# Patient Record
Sex: FEMALE | Race: WHITE | Employment: FULL TIME | URBAN - METROPOLITAN AREA
[De-identification: names, ages, dates, MRNs, and addresses within clinical notes are randomized per-mention and may not be internally consistent; named-entity substitution may affect disease eponyms.]

---

## 2024-06-12 ENCOUNTER — HOSPITAL ENCOUNTER (EMERGENCY)
Age: 64
Discharge: HOME | End: 2024-06-12
Attending: STUDENT IN AN ORGANIZED HEALTH CARE EDUCATION/TRAINING PROGRAM
Payer: COMMERCIAL

## 2024-06-12 ENCOUNTER — ANCILLARY PROCEDURE (OUTPATIENT)
Dept: EMERGENCY DEPT | Age: 64
End: 2024-06-12
Attending: STUDENT IN AN ORGANIZED HEALTH CARE EDUCATION/TRAINING PROGRAM
Payer: COMMERCIAL

## 2024-06-12 VITALS
BODY MASS INDEX: 40.9 KG/M2 | SYSTOLIC BLOOD PRESSURE: 134 MMHG | HEIGHT: 67 IN | RESPIRATION RATE: 16 BRPM | WEIGHT: 260.6 LBS | OXYGEN SATURATION: 98 % | HEART RATE: 72 BPM | TEMPERATURE: 98.4 F | DIASTOLIC BLOOD PRESSURE: 75 MMHG

## 2024-06-12 DIAGNOSIS — T63.331A BROWN RECLUSE SPIDER BITE OR STING, ACCIDENTAL OR UNINTENTIONAL, INITIAL ENCOUNTER: Primary | ICD-10-CM

## 2024-06-12 DIAGNOSIS — L03.115 CELLULITIS OF RIGHT LOWER EXTREMITY: ICD-10-CM

## 2024-06-12 PROCEDURE — 99284 EMERGENCY DEPT VISIT MOD MDM: CPT

## 2024-06-12 PROCEDURE — 6370000000 HC RX 637 (ALT 250 FOR IP): Performed by: STUDENT IN AN ORGANIZED HEALTH CARE EDUCATION/TRAINING PROGRAM

## 2024-06-12 PROCEDURE — 76882 US LMTD JT/FCL EVL NVASC XTR: CPT

## 2024-06-12 RX ORDER — HYDROXYZINE HYDROCHLORIDE 10 MG/1
10 TABLET, FILM COATED ORAL EVERY 4 HOURS PRN
Qty: 20 TABLET | Refills: 0 | Status: SHIPPED | OUTPATIENT
Start: 2024-06-12 | End: 2024-06-22

## 2024-06-12 RX ORDER — HYDROXYZINE HYDROCHLORIDE 10 MG/1
10 TABLET, FILM COATED ORAL EVERY 4 HOURS PRN
Qty: 20 TABLET | Refills: 0 | Status: SHIPPED | OUTPATIENT
Start: 2024-06-12 | End: 2024-06-12

## 2024-06-12 RX ORDER — CEPHALEXIN 500 MG/1
500 CAPSULE ORAL 4 TIMES DAILY
Qty: 28 CAPSULE | Refills: 0 | Status: SHIPPED | OUTPATIENT
Start: 2024-06-12 | End: 2024-06-12 | Stop reason: CLARIF

## 2024-06-12 RX ORDER — SULFAMETHOXAZOLE AND TRIMETHOPRIM 800; 160 MG/1; MG/1
1 TABLET ORAL 2 TIMES DAILY
Qty: 14 TABLET | Refills: 0 | Status: SHIPPED | OUTPATIENT
Start: 2024-06-12 | End: 2024-06-12 | Stop reason: CLARIF

## 2024-06-12 RX ORDER — HYDROXYZINE HYDROCHLORIDE 10 MG/1
10 TABLET, FILM COATED ORAL ONCE
Status: COMPLETED | OUTPATIENT
Start: 2024-06-12 | End: 2024-06-12

## 2024-06-12 RX ADMIN — HYDROXYZINE HYDROCHLORIDE 10 MG: 10 TABLET, FILM COATED ORAL at 08:48

## 2024-06-12 ASSESSMENT — LIFESTYLE VARIABLES
HOW OFTEN DO YOU HAVE A DRINK CONTAINING ALCOHOL: MONTHLY OR LESS
HOW MANY STANDARD DRINKS CONTAINING ALCOHOL DO YOU HAVE ON A TYPICAL DAY: 1 OR 2

## 2024-06-12 ASSESSMENT — PAIN - FUNCTIONAL ASSESSMENT: PAIN_FUNCTIONAL_ASSESSMENT: NONE - DENIES PAIN

## 2024-06-12 NOTE — ED PROVIDER NOTES
THE [facility]  EMERGENCY DEPARTMENT ENCOUNTER          ATTENDING PHYSICIAN NOTE       Date of evaluation: 6/12/2024    Chief Complaint     Insect Bite (Patient presents to ED with report of a possible spider bite to her right lateral lower leg. Reports [facility], states she was told she possibly had a brown recluse spider bite, was prescribed antibiotics. Reports increased itching.)    History of Present Illness     Micaela Castro is a 63 y.o. female with pertinent PMHx including GERD, diverticulitis, who presents to the emergency department with concern for spider bite.  Patient states 1 week ago, she was at work when she felt a stinging pain in her right lower extremity.  She does small area of redness which then progressed to having an area of central darkness and surrounding erythema.  It was painful and itchy.  It has progressively worsened over the last 1 week despite reportedly taking outpatient antibiotics (Augmentin, doxycycline) prescribed by the [facility] 2 days after  symptom onset.  She states that her right lower extremity has been swelling and she is having somewhat increased pain.  She has been trying to use ice packs and compression stockings as needed.  Comes to the ED today primarily because symptoms are not getting better and her itching is very severe.  No fevers, chills.  No leg weakness or numbness.    Nursing notes, PSHx, Social history, current medications and allergies were reviewed as documented in EHR and triage.     Physical Exam     INITIAL VITALS: BP: 134/75, Temp: 98.4 °F (36.9 °C), Pulse: 72, Respirations: 16, SpO2: 98 %     General:  WDWN obese female in NAD, nontoxic appearing   Extremities:  Warm, 2+ radial pulses b/l, 2+ DP pulses b/l. No extremity deformity appreciated  Skin: No petechiae or bruises, no appreciable pallor.  Right lower extremity noted to have dark erythematous maculopapular rash with darkened area and central punctum.

## 2024-06-12 NOTE — DISCHARGE INSTRUCTIONS
You were seen in the emergency department today for an area of painful redness on your right leg.  As we discussed, this is likely from an initial spider bite although there may be a small area of secondary infection called cellulitis which  will be treated with the antibiotics that you are already taking..  Please continue to take ibuprofen and/or Tylenol as needed to help with the pain.  Cool ice packs can also help with the pain and swelling.  Please elevate your leg and wear compression stockings as much as possible.  It can take days to weeks for this to resolve although the antibiotic should help.    Please make sure that you take your antibiotics with food and a full glass of water.

## 2025-07-23 ENCOUNTER — HOSPITAL ENCOUNTER (INPATIENT)
Age: 65
LOS: 3 days | Discharge: HOME | DRG: 645 | End: 2025-07-26
Attending: EMERGENCY MEDICINE | Admitting: HOSPITALIST
Payer: COMMERCIAL

## 2025-07-23 ENCOUNTER — APPOINTMENT (OUTPATIENT)
Dept: CT IMAGING | Age: 65
DRG: 645 | End: 2025-07-23
Payer: COMMERCIAL

## 2025-07-23 DIAGNOSIS — E87.1 HYPONATREMIA: Primary | ICD-10-CM

## 2025-07-23 LAB
ALBUMIN SERPL-MCNC: 4.3 G/DL (ref 3.4–5)
ALP SERPL-CCNC: 63 U/L (ref 40–129)
ALT SERPL-CCNC: 23 U/L (ref 10–40)
AMORPH SED URNS QL MICRO: ABNORMAL /HPF
ANION GAP SERPL CALCULATED.3IONS-SCNC: 11 MMOL/L (ref 3–16)
ANION GAP SERPL CALCULATED.3IONS-SCNC: 11 MMOL/L (ref 3–16)
ANION GAP SERPL CALCULATED.3IONS-SCNC: 9 MMOL/L (ref 3–16)
AST SERPL-CCNC: 28 U/L (ref 15–37)
BACTERIA URNS QL MICRO: ABNORMAL /HPF
BASOPHILS # BLD: 0 K/UL (ref 0–0.2)
BASOPHILS NFR BLD: 0.3 %
BILIRUB DIRECT SERPL-MCNC: 0.5 MG/DL (ref 0–0.3)
BILIRUB INDIRECT SERPL-MCNC: 0.9 MG/DL (ref 0–1)
BILIRUB SERPL-MCNC: 1.4 MG/DL (ref 0–1)
BILIRUB UR QL STRIP.AUTO: NEGATIVE
BUN SERPL-MCNC: 7 MG/DL (ref 7–20)
BUN SERPL-MCNC: 7 MG/DL (ref 7–20)
BUN SERPL-MCNC: 8 MG/DL (ref 7–20)
CALCIUM SERPL-MCNC: 8.6 MG/DL (ref 8.3–10.6)
CALCIUM SERPL-MCNC: 9 MG/DL (ref 8.3–10.6)
CALCIUM SERPL-MCNC: 9.4 MG/DL (ref 8.3–10.6)
CHLORIDE SERPL-SCNC: 80 MMOL/L (ref 99–110)
CHLORIDE SERPL-SCNC: 80 MMOL/L (ref 99–110)
CHLORIDE SERPL-SCNC: 81 MMOL/L (ref 99–110)
CLARITY UR: CLEAR
CO2 SERPL-SCNC: 23 MMOL/L (ref 21–32)
CO2 SERPL-SCNC: 24 MMOL/L (ref 21–32)
CO2 SERPL-SCNC: 24 MMOL/L (ref 21–32)
COLOR UR: YELLOW
CREAT SERPL-MCNC: 0.5 MG/DL (ref 0.6–1.2)
CREAT UR-MCNC: 25.7 MG/DL (ref 28–259)
DEPRECATED RDW RBC AUTO: 13.4 % (ref 12.4–15.4)
EKG ATRIAL RATE: 67 BPM
EKG DIAGNOSIS: NORMAL
EKG P AXIS: 40 DEGREES
EKG P-R INTERVAL: 174 MS
EKG Q-T INTERVAL: 418 MS
EKG QRS DURATION: 92 MS
EKG QTC CALCULATION (BAZETT): 441 MS
EKG R AXIS: 44 DEGREES
EKG T AXIS: 47 DEGREES
EKG VENTRICULAR RATE: 67 BPM
EOSINOPHIL # BLD: 0.1 K/UL (ref 0–0.6)
EOSINOPHIL NFR BLD: 1.2 %
GFR SERPLBLD CREATININE-BSD FMLA CKD-EPI: >90 ML/MIN/{1.73_M2}
GLUCOSE SERPL-MCNC: 102 MG/DL (ref 70–99)
GLUCOSE SERPL-MCNC: 96 MG/DL (ref 70–99)
GLUCOSE SERPL-MCNC: 97 MG/DL (ref 70–99)
GLUCOSE UR STRIP.AUTO-MCNC: NEGATIVE MG/DL
HCT VFR BLD AUTO: 38.9 % (ref 36–48)
HGB BLD-MCNC: 14 G/DL (ref 12–16)
HGB UR QL STRIP.AUTO: ABNORMAL
KETONES UR STRIP.AUTO-MCNC: ABNORMAL MG/DL
LEUKOCYTE ESTERASE UR QL STRIP.AUTO: NEGATIVE
LYMPHOCYTES # BLD: 1.5 K/UL (ref 1–5.1)
LYMPHOCYTES NFR BLD: 18.6 %
MCH RBC QN AUTO: 29.3 PG (ref 26–34)
MCHC RBC AUTO-ENTMCNC: 35.9 G/DL (ref 31–36)
MCV RBC AUTO: 81.6 FL (ref 80–100)
MONOCYTES # BLD: 0.4 K/UL (ref 0–1.3)
MONOCYTES NFR BLD: 5.4 %
NEUTROPHILS # BLD: 6.2 K/UL (ref 1.7–7.7)
NEUTROPHILS NFR BLD: 74.5 %
NITRITE UR QL STRIP.AUTO: NEGATIVE
NT-PROBNP SERPL-MCNC: 66 PG/ML (ref 0–124)
OSMOLALITY SERPL: 246 MOSM/KG (ref 278–305)
OSMOLALITY UR: 262 MOSM/KG (ref 390–1070)
PH UR STRIP.AUTO: 7.5 [PH] (ref 5–8)
PLATELET # BLD AUTO: 263 K/UL (ref 135–450)
PMV BLD AUTO: 6 FL (ref 5–10.5)
POTASSIUM SERPL-SCNC: 3.9 MMOL/L (ref 3.5–5.1)
POTASSIUM SERPL-SCNC: 3.9 MMOL/L (ref 3.5–5.1)
POTASSIUM SERPL-SCNC: 4.2 MMOL/L (ref 3.5–5.1)
PROT SERPL-MCNC: 7.5 G/DL (ref 6.4–8.2)
PROT UR STRIP.AUTO-MCNC: NEGATIVE MG/DL
RBC # BLD AUTO: 4.77 M/UL (ref 4–5.2)
RBC #/AREA URNS HPF: ABNORMAL /HPF (ref 0–4)
SODIUM SERPL-SCNC: 113 MMOL/L (ref 136–145)
SODIUM SERPL-SCNC: 114 MMOL/L (ref 136–145)
SODIUM SERPL-SCNC: 116 MMOL/L (ref 136–145)
SODIUM SERPL-SCNC: 120 MMOL/L (ref 136–145)
SODIUM SERPL-SCNC: 121 MMOL/L (ref 136–145)
SODIUM UR-SCNC: 66 MMOL/L
SP GR UR STRIP.AUTO: 1.01 (ref 1–1.03)
TROPONIN, HIGH SENSITIVITY: <6 NG/L (ref 0–14)
TROPONIN, HIGH SENSITIVITY: <6 NG/L (ref 0–14)
TSH SERPL DL<=0.005 MIU/L-ACNC: 1.55 UIU/ML (ref 0.27–4.2)
UA DIPSTICK W REFLEX MICRO PNL UR: YES
URATE SERPL-MCNC: 3.3 MG/DL (ref 2.6–6)
URN SPEC COLLECT METH UR: ABNORMAL
UROBILINOGEN UR STRIP-ACNC: 0.2 E.U./DL
WBC # BLD AUTO: 8.3 K/UL (ref 4–11)
WBC #/AREA URNS HPF: ABNORMAL /HPF (ref 0–5)

## 2025-07-23 PROCEDURE — 83930 ASSAY OF BLOOD OSMOLALITY: CPT

## 2025-07-23 PROCEDURE — 93005 ELECTROCARDIOGRAM TRACING: CPT | Performed by: EMERGENCY MEDICINE

## 2025-07-23 PROCEDURE — 80048 BASIC METABOLIC PNL TOTAL CA: CPT

## 2025-07-23 PROCEDURE — 99285 EMERGENCY DEPT VISIT HI MDM: CPT

## 2025-07-23 PROCEDURE — 96360 HYDRATION IV INFUSION INIT: CPT

## 2025-07-23 PROCEDURE — 6370000000 HC RX 637 (ALT 250 FOR IP)

## 2025-07-23 PROCEDURE — 83935 ASSAY OF URINE OSMOLALITY: CPT

## 2025-07-23 PROCEDURE — 85025 COMPLETE CBC W/AUTO DIFF WBC: CPT

## 2025-07-23 PROCEDURE — 6370000000 HC RX 637 (ALT 250 FOR IP): Performed by: INTERNAL MEDICINE

## 2025-07-23 PROCEDURE — 70496 CT ANGIOGRAPHY HEAD: CPT

## 2025-07-23 PROCEDURE — 6360000002 HC RX W HCPCS

## 2025-07-23 PROCEDURE — 84550 ASSAY OF BLOOD/URIC ACID: CPT

## 2025-07-23 PROCEDURE — 84300 ASSAY OF URINE SODIUM: CPT

## 2025-07-23 PROCEDURE — 81001 URINALYSIS AUTO W/SCOPE: CPT

## 2025-07-23 PROCEDURE — 6360000004 HC RX CONTRAST MEDICATION: Performed by: EMERGENCY MEDICINE

## 2025-07-23 PROCEDURE — 84295 ASSAY OF SERUM SODIUM: CPT

## 2025-07-23 PROCEDURE — 96361 HYDRATE IV INFUSION ADD-ON: CPT

## 2025-07-23 PROCEDURE — 36415 COLL VENOUS BLD VENIPUNCTURE: CPT

## 2025-07-23 PROCEDURE — 2580000003 HC RX 258: Performed by: INTERNAL MEDICINE

## 2025-07-23 PROCEDURE — 84443 ASSAY THYROID STIM HORMONE: CPT

## 2025-07-23 PROCEDURE — 51798 US URINE CAPACITY MEASURE: CPT

## 2025-07-23 PROCEDURE — 80076 HEPATIC FUNCTION PANEL: CPT

## 2025-07-23 PROCEDURE — 2500000003 HC RX 250 WO HCPCS

## 2025-07-23 PROCEDURE — 82570 ASSAY OF URINE CREATININE: CPT

## 2025-07-23 PROCEDURE — 70450 CT HEAD/BRAIN W/O DYE: CPT

## 2025-07-23 PROCEDURE — 2580000003 HC RX 258: Performed by: EMERGENCY MEDICINE

## 2025-07-23 PROCEDURE — 2000000000 HC ICU R&B

## 2025-07-23 PROCEDURE — 83880 ASSAY OF NATRIURETIC PEPTIDE: CPT

## 2025-07-23 PROCEDURE — 2580000003 HC RX 258

## 2025-07-23 PROCEDURE — 84484 ASSAY OF TROPONIN QUANT: CPT

## 2025-07-23 RX ORDER — DEXTROSE MONOHYDRATE 50 MG/ML
INJECTION, SOLUTION INTRAVENOUS CONTINUOUS
Status: ACTIVE | OUTPATIENT
Start: 2025-07-23 | End: 2025-07-23

## 2025-07-23 RX ORDER — IBUPROFEN 600 MG/1
600 TABLET, FILM COATED ORAL 2 TIMES DAILY
Status: ON HOLD | COMMUNITY
End: 2025-07-26

## 2025-07-23 RX ORDER — SODIUM CHLORIDE 0.9 % (FLUSH) 0.9 %
5-40 SYRINGE (ML) INJECTION EVERY 12 HOURS SCHEDULED
Status: DISCONTINUED | OUTPATIENT
Start: 2025-07-23 | End: 2025-07-26 | Stop reason: HOSPADM

## 2025-07-23 RX ORDER — ACETAMINOPHEN 650 MG/1
650 SUPPOSITORY RECTAL EVERY 6 HOURS PRN
Status: DISCONTINUED | OUTPATIENT
Start: 2025-07-23 | End: 2025-07-26 | Stop reason: HOSPADM

## 2025-07-23 RX ORDER — FLUOXETINE 10 MG/1
10 CAPSULE ORAL DAILY
Status: DISCONTINUED | OUTPATIENT
Start: 2025-07-23 | End: 2025-07-23

## 2025-07-23 RX ORDER — SODIUM CHLORIDE, SODIUM LACTATE, POTASSIUM CHLORIDE, AND CALCIUM CHLORIDE .6; .31; .03; .02 G/100ML; G/100ML; G/100ML; G/100ML
1000 INJECTION, SOLUTION INTRAVENOUS ONCE
Status: COMPLETED | OUTPATIENT
Start: 2025-07-23 | End: 2025-07-23

## 2025-07-23 RX ORDER — ENOXAPARIN SODIUM 100 MG/ML
30 INJECTION SUBCUTANEOUS 2 TIMES DAILY
Status: DISCONTINUED | OUTPATIENT
Start: 2025-07-23 | End: 2025-07-26 | Stop reason: HOSPADM

## 2025-07-23 RX ORDER — MECLIZINE HYDROCHLORIDE 25 MG/1
25 TABLET ORAL NIGHTLY
Status: DISCONTINUED | OUTPATIENT
Start: 2025-07-23 | End: 2025-07-26 | Stop reason: HOSPADM

## 2025-07-23 RX ORDER — ACETAMINOPHEN 325 MG/1
650 TABLET ORAL EVERY 6 HOURS PRN
Status: DISCONTINUED | OUTPATIENT
Start: 2025-07-23 | End: 2025-07-26 | Stop reason: HOSPADM

## 2025-07-23 RX ORDER — IOPAMIDOL 755 MG/ML
75 INJECTION, SOLUTION INTRAVASCULAR
Status: COMPLETED | OUTPATIENT
Start: 2025-07-23 | End: 2025-07-23

## 2025-07-23 RX ORDER — AMLODIPINE BESYLATE 5 MG/1
5 TABLET ORAL DAILY
Status: DISCONTINUED | OUTPATIENT
Start: 2025-07-23 | End: 2025-07-26 | Stop reason: HOSPADM

## 2025-07-23 RX ORDER — ONDANSETRON 4 MG/1
4 TABLET, ORALLY DISINTEGRATING ORAL EVERY 8 HOURS PRN
Status: DISCONTINUED | OUTPATIENT
Start: 2025-07-23 | End: 2025-07-26 | Stop reason: HOSPADM

## 2025-07-23 RX ORDER — EZETIMIBE 10 MG/1
10 TABLET ORAL DAILY
Status: DISCONTINUED | OUTPATIENT
Start: 2025-07-24 | End: 2025-07-26 | Stop reason: HOSPADM

## 2025-07-23 RX ORDER — EZETIMIBE 10 MG/1
10 TABLET ORAL DAILY
COMMUNITY

## 2025-07-23 RX ORDER — SODIUM CHLORIDE 0.9 % (FLUSH) 0.9 %
5-40 SYRINGE (ML) INJECTION PRN
Status: DISCONTINUED | OUTPATIENT
Start: 2025-07-23 | End: 2025-07-26 | Stop reason: HOSPADM

## 2025-07-23 RX ORDER — MONTELUKAST SODIUM 10 MG/1
10 TABLET ORAL DAILY
Status: DISCONTINUED | OUTPATIENT
Start: 2025-07-24 | End: 2025-07-26 | Stop reason: HOSPADM

## 2025-07-23 RX ORDER — FLUOXETINE 10 MG/1
10 CAPSULE ORAL NIGHTLY
Status: DISCONTINUED | OUTPATIENT
Start: 2025-07-23 | End: 2025-07-24

## 2025-07-23 RX ORDER — FLUOXETINE 10 MG/1
10 CAPSULE ORAL NIGHTLY
Status: DISCONTINUED | OUTPATIENT
Start: 2025-07-24 | End: 2025-07-23

## 2025-07-23 RX ORDER — ONDANSETRON 2 MG/ML
4 INJECTION INTRAMUSCULAR; INTRAVENOUS EVERY 6 HOURS PRN
Status: DISCONTINUED | OUTPATIENT
Start: 2025-07-23 | End: 2025-07-26 | Stop reason: HOSPADM

## 2025-07-23 RX ORDER — POLYETHYLENE GLYCOL 3350 17 G/17G
17 POWDER, FOR SOLUTION ORAL DAILY PRN
Status: DISCONTINUED | OUTPATIENT
Start: 2025-07-23 | End: 2025-07-26 | Stop reason: HOSPADM

## 2025-07-23 RX ORDER — MECLIZINE HYDROCHLORIDE 25 MG/1
25 TABLET ORAL NIGHTLY
COMMUNITY

## 2025-07-23 RX ORDER — MECLIZINE HCL 12.5 MG 12.5 MG/1
12.5 TABLET ORAL NIGHTLY
Status: DISCONTINUED | OUTPATIENT
Start: 2025-07-23 | End: 2025-07-23

## 2025-07-23 RX ORDER — MONTELUKAST SODIUM 10 MG/1
10 TABLET ORAL DAILY
COMMUNITY

## 2025-07-23 RX ORDER — SODIUM CHLORIDE 9 MG/ML
INJECTION, SOLUTION INTRAVENOUS PRN
Status: DISCONTINUED | OUTPATIENT
Start: 2025-07-23 | End: 2025-07-26 | Stop reason: HOSPADM

## 2025-07-23 RX ORDER — SODIUM CHLORIDE 9 MG/ML
INJECTION, SOLUTION INTRAVENOUS CONTINUOUS
Status: DISCONTINUED | OUTPATIENT
Start: 2025-07-23 | End: 2025-07-23

## 2025-07-23 RX ORDER — CHLORTHALIDONE 25 MG/1
25 TABLET ORAL DAILY
Status: ON HOLD | COMMUNITY
Start: 2025-07-14 | End: 2025-07-23

## 2025-07-23 RX ADMIN — SODIUM CHLORIDE, PRESERVATIVE FREE 10 ML: 5 INJECTION INTRAVENOUS at 20:33

## 2025-07-23 RX ADMIN — ENOXAPARIN SODIUM 30 MG: 100 INJECTION SUBCUTANEOUS at 20:57

## 2025-07-23 RX ADMIN — AMLODIPINE BESYLATE 5 MG: 5 TABLET ORAL at 15:24

## 2025-07-23 RX ADMIN — FLUOXETINE HYDROCHLORIDE 10 MG: 10 CAPSULE ORAL at 21:38

## 2025-07-23 RX ADMIN — MECLIZINE HYDROCHLORIDE 25 MG: 25 TABLET ORAL at 20:57

## 2025-07-23 RX ADMIN — IOPAMIDOL 75 ML: 755 INJECTION, SOLUTION INTRAVENOUS at 13:37

## 2025-07-23 RX ADMIN — SODIUM CHLORIDE: 0.9 INJECTION, SOLUTION INTRAVENOUS at 15:22

## 2025-07-23 RX ADMIN — SODIUM CHLORIDE, SODIUM LACTATE, POTASSIUM CHLORIDE, AND CALCIUM CHLORIDE 1000 ML: .6; .31; .03; .02 INJECTION, SOLUTION INTRAVENOUS at 12:45

## 2025-07-23 RX ADMIN — DEXTROSE: 5 SOLUTION INTRAVENOUS at 20:30

## 2025-07-23 ASSESSMENT — PAIN SCALES - GENERAL
PAINLEVEL_OUTOF10: 0

## 2025-07-23 ASSESSMENT — ENCOUNTER SYMPTOMS
NAUSEA: 0
SHORTNESS OF BREATH: 0
CHEST TIGHTNESS: 0
DIARRHEA: 1
BLOOD IN STOOL: 0
VOMITING: 0
ABDOMINAL PAIN: 0

## 2025-07-23 ASSESSMENT — PAIN - FUNCTIONAL ASSESSMENT: PAIN_FUNCTIONAL_ASSESSMENT: 0-10

## 2025-07-23 NOTE — ED TRIAGE NOTES
Pt states \"I don't feel right. I feel like I am off balance.\" States that her blood pressure has been running high, last one was 140/100. Stopped taking BP medications because they were making her see \"lightning bolts.\" Denies chest pain or SOB

## 2025-07-23 NOTE — PROGRESS NOTES
Patient admitted to room 4506 from ED.     Alert and oriented x4. NSR on monitor. BP WDL. Room air.   Moves all extremities. Able to walk from wheelchair to bed.     Skin intact. CHG. Sacral heart refused by patient.    Oriented to room and call system. ICU made aware. Will monitor.

## 2025-07-23 NOTE — PROGRESS NOTES
4 Eyes Skin Assessment     NAME:  Micaela Castro  YOB: 1960  MEDICAL RECORD NUMBER:  [id number]    The patient is being assessed for  Admission    I agree that at least one RN has performed a thorough Head to Toe Skin Assessment on the patient. ALL assessment sites listed below have been assessed.      Areas assessed by both nurses:    Head, Face, Ears, Shoulders, Back, Chest, Arms, Elbows, Hands, Sacrum. Buttock, Coccyx, Ischium, Legs. Feet and Heels, and Under Medical Devices         Does the Patient have a Wound? No noted wound(s)    Skin intact. Sacral heart refused by patient.        Elmer Prevention initiated by RN: No  Wound Care Orders initiated by RN: No    For hospital-acquired stage 1 & 2 and ALL Stage 3,4, Unstageable, DTI, NWPT, and Complex wounds: place order “IP Wound Care/Ostomy Nurse Eval and Treat” by RN under : No    New Ostomies, if present place, Ostomy referral order under : No     Nurse 1 eSignature: Electronically signed by Tylor Bush RN on 7/23/25 at 5:59 PM EDT    **SHARE this note so that the co-signing nurse can place an eSignature**    Nurse 2 eSignature: Electronically signed by Aline Starkey RN on 7/23/25 at 6:10 PM EDT

## 2025-07-23 NOTE — CONSULTS
Ph: [phone], Fax: [phone]           [facility]               Reason for admission:                 Unsteadiness and headache.    Brief Summary :     Micaela Castro is being seen by nephrology for severe hyponatremia.      Interval History and plan:      Blood pressure is elevated.  Patient was seen in the room  with the residents    Plan:    I will check serum and urine osmolality.  I will check uric acid.  I will check TSH.  Will check sodium every 4 hourly.  Goal sodium is 1:25 AM.  Start amlodipine 5 mg once a day.  Start normal saline 75 mg once a day.  Further adjustments will be made once results are back.  Discontinue chlorthalidone.                   Assessment :     Hyponatremia: On arrival sodium was 113.  She had intermittent hyponatremia in the past.  Earlier this month her sodium was 134.    Serum glucose is normal.  I will check urine studies for SIADH although it appears that it is from chlorthalidone.  Since she has CNS changes earlier in the day she should be admitted in the ICU.  I will monitor sodium every 4 hour    Accelerated hypertension: Blood pressure is high and she stopped chlorthalidone.  Will start amlodipine and titrate as needed.           [facility] would like to thank Pérez Hilliard MD   for opportunity to serve this patient      Please call with questions at-   24 Hrs Answering service [phone] or  7 am- 5 pm via Perfect serve or cell phone  Dr.Muhammad Jeff Gamboa MD       HPI :     Micaela Castro is a 64 y.o. female presented to   the hospital on 7/23/2025 with unsteadiness and headache.    She has past medical history of depression, diverticulitis, GERD, osteoarthritis with chronic right shoulder pain.  She is status post colon surgery due to an abscess.  She occasionally has vertigo and takes meclizine.  She sees Dr. Dyer from gastroenterology for colon polyps and screening.  She is status post partial colectomy.  Recently she was

## 2025-07-23 NOTE — H&P
ICU HISTORY AND PHYSICAL       Admit Date:  7/23/2025                            Hospital Day: 1  ICU Day: Patient does not have an ICU stay during this admission.    CC: dizziness    History obtained from:  chart review and the patient    SUBJECTIVE   HPI:    Micaela Castro is a 64 y.o. female with pmhx of newly diagnosed HTN, HLD, MDD, vertigo presented to [facility] ED on 07/23/25 with dizziness. Last week (07/14/25) patient was started on chlorthalidone 25 mg qd by her PCP for newly diagnosed HTN. Patient reports she started getting flashes of light and experienced some cognitive slowing with speaking, starting around a week ago. Patient stopped the chlorthalidone on Sunday (07/20/25), but continued to have symptoms and decided to go to the ED.    In the ED she was found to have no neuro deficit, NIH 0, Na of 116. Given 1 L of LR, started on amlodipine, 75 mL / hr normal saline. CT Head WO & CTA Head / Neck were unremarkable. Patient reports she has never had episodes like this before. Patient was hypertensive to SBP 170s, states her blood pressure at home is typically in the 130-140s, with occasional readings as high as 170/120. During interview patient endorsed low urine output, stating she felt like \"I havent been able to go all the way\", which is new for her. Patient was admitted to ICU for management of severe hyponatremia (<120) with CNS signs & symptoms.     Review of Systems   Constitutional:  Positive for fatigue. Negative for fever.   Eyes:  Positive for visual disturbance.   Respiratory:  Negative for chest tightness and shortness of breath.    Cardiovascular:  Negative for chest pain, palpitations and leg swelling.   Gastrointestinal:  Positive for diarrhea. Negative for abdominal pain, blood in stool, nausea and vomiting.   Endocrine: Positive for polydipsia. Negative for polyuria.   Genitourinary:  Positive for difficulty urinating. Negative for dysuria, frequency and hematuria.   Neurological:  Positive      ECHO:   never    ASSESSMENT & PLAN     Micaela Castro is a 64 y.o. female with pmhx of newly diagnosed HTN, HLD, MDD, vertigo presented to [facility] ED on 07/23/25 with dizziness in the setting of recently starting (07/14/25) chlorthalidone 25 mg qd by her PCP for newly diagnosed HTN. Patient reported vision and cognitive changes in addition to dizziness    Neuro  #Vertigo  Patient denies loss of consciousness or focal neurological deficits, NIH 0. Low suspicion for stroke or Amaurosis Fugax in the setting of well controlled lipids, no afib, global vs focal signs & symptoms, CT Head WO & CT Head/Neck unremarkable. Loss of vision described as \"daggers coming in from the sides\" in both eyes.   Home meclizine 10 mg qd    Pulmonary  #Allergies  Home montelukast 10 mg qd     Cardiovascular  #HLD - well controlled recent (07/14/25) lipid panel wnl  Patient denies syncopal episodes or chest pain, trop 6, BNP 66  Home ezetimibe 10 mg qd  Start amlodipine 5 mg qd    GI  Reports two bouts of diarrhea on Monday (07/21/25), no blood.  Diet: No diet orders on file    Renal   Presented to ED with Na of 114, improved to 116 with isotonic IVF, patient admitted to ICU in the setting of severe hyponatremia (<120) with CNS signs & symptoms  DDX: SIADH (hx thiazide & fluoxetine, uOSM 262, TSH wnl) vs HHS (+ polydipsia, - polyuria, bgl wnl) vs thiazide induced hyponatremia (recent thiazide usage correlating with s /s)   Urine Output:  Nephrology following, appreciate recommendations   Goal of Na 120 by 07/24/25 @ 0600  Start normal saline 75 mL / hr  Monitor serum Na q4h  Serum studies  Cr 0.5  Na 114  Osm 246  Uric acid 3.3  Serum glucose 96  Urine studies  Na 66  Osm 262    In ED patient endorsed some urinary retention  Ordered one time bladder scan     Endo  TSH 1.55 wnl    Heme  WBC 8.3  Hgb 14    Infectious   Patient denies fever, recent illness, or abx usage. U/A un concerning for UTI    Psych  #MDD  Home fluoxetine 10 mg

## 2025-07-23 NOTE — ED NOTES
Patient Name: Micaela Castro  : 1960 64 y.o.  MRN: [id number]  ED Room #: A03/A03-03     Chief complaint:   Chief Complaint   Patient presents with    Hypertension     Pt states \"I don't feel right. I feel like I am off balance.\" States that her blood pressure has been running high, last one was 140/100. Stopped taking BP medications because they were making her see \"lightning bolts.\" Denies chest pain or SOB     Gait Problem     Hospital Problem/Diagnosis:   Hospital Problems           Last Modified POA    * (Principal) Hyponatremia 2025 Yes         O2 Flow Rate:O2 Device: None (Room air)   (if applicable)  Cardiac Rhythm:   (if applicable)  Active LDA's:   Peripheral IV 25 Left Antecubital (Active)            How does patient ambulate? Low Fall Risk (Ambulates by themselves without support    2. How does patient take pills? Whole with Water    3. Is patient alert? Alert    4. Is patient oriented? To Person, To Place, To Time, To Situation, and Follows Commands    5.   Patient arrived from:  home  Facility Name: ___________________________________________    6. If patient is disoriented or from a Skill Nursing Facility has family been notified of admission?     7. Patient belongings? Belongings: Cell Phone and Clothing    Disposition of belongings? Kept with Patient     8. Any specific patient or family belongings/needs/dynamics?   a.     9. Miscellaneous comments/pending orders?  a. This pt is from home with spouse. She is alert and fully oriented. She has been ambulating independently. Continent of bowel and bladder. IV in L AC. Hypertensive while in ED, vitals stable otherwise, on room air. Seizure pads in place.       If there are any additional questions please reach out to the Emergency Department.       Tc Cordova RN  [phone]

## 2025-07-23 NOTE — ED PROVIDER NOTES
THE [facility]  EMERGENCY DEPARTMENT ENCOUNTER          ATTENDING PHYSICIAN NOTE       Date of evaluation: 7/23/2025      Assessment/ Medical Decion Making     MDM:     ED Course as of 07/24/25 1306   Wed Jul 23, 2025   1255 64 year old female with PMH inclusive of new dx HTN who presents for evaluation of vision changes and dizziness with concern for slow speech. She is hypertensive here but has no neurological deficits on exam. NIHSS is 0,so I did not call a stroke alert. EKG obtained and interpreted by me with sinus rhythm, normal axis, normal intervals and no ST or T wave abnormalities.  [MM]   1428 Labs remarkable for profound hyponatremia with . Recheck 113. This would account for vague neurological symptoms without focal deficits, but this is a profound drop from Na 134 on 7/18. Suspect may be related to chlorthalidone. Nephrology consulted. Seizure pads placed. No change in mental status on my repeat clinical assessment.  [MM]   1531 Of note, she initially had an LR bolus ordered because she felt dehydrated.  She received about 150 mL of this prior to labs resulting.  Fluids were subsequently stopped.  Nephrologist has evaluated her and placed additional orders.  I have consulted intensive care unit for evaluation. [MM]      ED Course User Index  [MM] Pérez Hilliard MD     Update: Will be admitted to ICU.    Medical Decision Making  Amount and/or Complexity of Data Reviewed  Labs: ordered.  Radiology: ordered.  ECG/medicine tests: ordered.    Risk  OTC drugs.  Prescription drug management.  Decision regarding hospitalization.          Critical Care:  Due to the immediate potential for life-threatening deterioration due to severe hyponatremia, I spent 34 minutes providing critical care.  Thistime excludes time spent performing procedures but includes time spent on direct patient care, history retrieval, review of the chart, and discussions with patient, family, and consultant(s).      Pérez

## 2025-07-23 NOTE — PROGRESS NOTES
Clinical Pharmacy Consult Note  Medication History     Admit Date: 7/23/2025    List of current medications patient is taking is complete. Home Medication list in Epic updated to reflect changes noted below.    Source of information: Pt self and prior dispense history    Patient's home pharmacy:   [facility] [place], [place] - [address] [phone] - F [phone]  [address] [address]  Phone: [phone] Fax: [phone]      Changes made to medication list:   Medications removed: (include reason, ex: therapy completed, patient no longer taking, etc.)  Alprazolam 0.5- pt reported not taking anymore  Aspirin 81- pt reported not taking anymore  Benzonatate 100- pt reported not taking anymore  Ferrous sulfate 325- pt reported not taking anymore  Ibuprofen 200- Pt taking 600mg tabs  Medications added:   Ezetimibe 10mg- 1QD  Ibuprofen 600- BID, 1am & 1pm   Meclizine 25- 1 HS (prescribed as 1 TID, preferred taking only 1 at bedtime)  Montelukast 10- 1 QD  Progesterone- dosing unknown by pt. Prescribed post-op, but no dosing record found.  Medication doses adjusted:   Ibuprofen- pt taking 600mg tablets   Vit D- changed from 35,000 units once weekly to 5000 units QD  Fexofenadine- takes half tab in the morning. Also takes half a tab at night PRN  Flonase- takes twice daily, not once daily  Other notes:   Chlorthalidone 25- Pt reported seeing \"stars or lightening\" and stopped taking since Sunday.    Current Outpatient Medications   Medication Instructions    ALPRAZolam (XANAX) 0.5 MG tablet TAKE ONE TABLET BY MOUTH EVERY 8 HOURS AS NEEDED FOR ANXIETY OR SLEEP    aspirin 81 mg, DAILY    benzonatate (TESSALON PERLES) 100 mg, Oral, 3 TIMES DAILY PRN    chlorthalidone (HYGROTON) 25 mg, Oral, DAILY    ezetimibe (ZETIA) 10 mg, Oral, DAILY    ferrous sulfate (IRON 325) 325 mg, DAILY WITH BREAKFAST    Fexofenadine HCl (ALLEGRA PO) 0.5 tablets, Oral, 2 times daily, Take 0.5 tab daily

## 2025-07-24 LAB
ALBUMIN SERPL-MCNC: 4.2 G/DL (ref 3.4–5)
ANION GAP SERPL CALCULATED.3IONS-SCNC: 11 MMOL/L (ref 3–16)
BASOPHILS # BLD: 0 K/UL (ref 0–0.2)
BASOPHILS NFR BLD: 0.4 %
BUN SERPL-MCNC: 8 MG/DL (ref 7–20)
CALCIUM SERPL-MCNC: 9.1 MG/DL (ref 8.3–10.6)
CHLORIDE SERPL-SCNC: 86 MMOL/L (ref 99–110)
CO2 SERPL-SCNC: 24 MMOL/L (ref 21–32)
CREAT SERPL-MCNC: 0.6 MG/DL (ref 0.6–1.2)
DEPRECATED RDW RBC AUTO: 13.3 % (ref 12.4–15.4)
EOSINOPHIL # BLD: 0.1 K/UL (ref 0–0.6)
EOSINOPHIL NFR BLD: 0.6 %
GFR SERPLBLD CREATININE-BSD FMLA CKD-EPI: >90 ML/MIN/{1.73_M2}
GLUCOSE SERPL-MCNC: 115 MG/DL (ref 70–99)
HCT VFR BLD AUTO: 42.4 % (ref 36–48)
HGB BLD-MCNC: 15.1 G/DL (ref 12–16)
LYMPHOCYTES # BLD: 1.2 K/UL (ref 1–5.1)
LYMPHOCYTES NFR BLD: 12 %
MAGNESIUM SERPL-MCNC: 2.09 MG/DL (ref 1.8–2.4)
MCH RBC QN AUTO: 29.2 PG (ref 26–34)
MCHC RBC AUTO-ENTMCNC: 35.6 G/DL (ref 31–36)
MCV RBC AUTO: 82.1 FL (ref 80–100)
MONOCYTES # BLD: 0.7 K/UL (ref 0–1.3)
MONOCYTES NFR BLD: 6.8 %
NEUTROPHILS # BLD: 7.9 K/UL (ref 1.7–7.7)
NEUTROPHILS NFR BLD: 80.2 %
PHOSPHATE SERPL-MCNC: 2.8 MG/DL (ref 2.5–4.9)
PLATELET # BLD AUTO: 260 K/UL (ref 135–450)
PMV BLD AUTO: 6.4 FL (ref 5–10.5)
POTASSIUM SERPL-SCNC: 3.6 MMOL/L (ref 3.5–5.1)
RBC # BLD AUTO: 5.17 M/UL (ref 4–5.2)
SODIUM SERPL-SCNC: 117 MMOL/L (ref 136–145)
SODIUM SERPL-SCNC: 118 MMOL/L (ref 136–145)
SODIUM SERPL-SCNC: 119 MMOL/L (ref 136–145)
SODIUM SERPL-SCNC: 120 MMOL/L (ref 136–145)
SODIUM SERPL-SCNC: 121 MMOL/L (ref 136–145)
SODIUM SERPL-SCNC: 121 MMOL/L (ref 136–145)
SODIUM SERPL-SCNC: 122 MMOL/L (ref 136–145)
SODIUM SERPL-SCNC: 123 MMOL/L (ref 136–145)
WBC # BLD AUTO: 9.8 K/UL (ref 4–11)

## 2025-07-24 PROCEDURE — 83735 ASSAY OF MAGNESIUM: CPT

## 2025-07-24 PROCEDURE — 80069 RENAL FUNCTION PANEL: CPT

## 2025-07-24 PROCEDURE — 2580000003 HC RX 258

## 2025-07-24 PROCEDURE — 6360000002 HC RX W HCPCS

## 2025-07-24 PROCEDURE — 84295 ASSAY OF SERUM SODIUM: CPT

## 2025-07-24 PROCEDURE — 99223 1ST HOSP IP/OBS HIGH 75: CPT | Performed by: INTERNAL MEDICINE

## 2025-07-24 PROCEDURE — 36415 COLL VENOUS BLD VENIPUNCTURE: CPT

## 2025-07-24 PROCEDURE — 85025 COMPLETE CBC W/AUTO DIFF WBC: CPT

## 2025-07-24 PROCEDURE — 6370000000 HC RX 637 (ALT 250 FOR IP): Performed by: INTERNAL MEDICINE

## 2025-07-24 PROCEDURE — 2000000000 HC ICU R&B

## 2025-07-24 PROCEDURE — 6370000000 HC RX 637 (ALT 250 FOR IP)

## 2025-07-24 PROCEDURE — 2500000003 HC RX 250 WO HCPCS

## 2025-07-24 PROCEDURE — 2580000003 HC RX 258: Performed by: INTERNAL MEDICINE

## 2025-07-24 RX ORDER — DEXTROSE MONOHYDRATE 50 MG/ML
INJECTION, SOLUTION INTRAVENOUS CONTINUOUS
Status: DISCONTINUED | OUTPATIENT
Start: 2025-07-24 | End: 2025-07-24

## 2025-07-24 RX ORDER — DESMOPRESSIN ACETATE 4 UG/ML
2 INJECTION, SOLUTION INTRAVENOUS; SUBCUTANEOUS ONCE
Status: COMPLETED | OUTPATIENT
Start: 2025-07-24 | End: 2025-07-24

## 2025-07-24 RX ORDER — TOLVAPTAN 15 MG/1
15 TABLET ORAL ONCE
Status: COMPLETED | OUTPATIENT
Start: 2025-07-24 | End: 2025-07-24

## 2025-07-24 RX ADMIN — AMLODIPINE BESYLATE 5 MG: 5 TABLET ORAL at 08:55

## 2025-07-24 RX ADMIN — MONTELUKAST 10 MG: 10 TABLET, FILM COATED ORAL at 08:55

## 2025-07-24 RX ADMIN — MECLIZINE HYDROCHLORIDE 25 MG: 25 TABLET ORAL at 20:30

## 2025-07-24 RX ADMIN — ENOXAPARIN SODIUM 30 MG: 100 INJECTION SUBCUTANEOUS at 20:30

## 2025-07-24 RX ADMIN — DEXTROSE: 5 SOLUTION INTRAVENOUS at 05:27

## 2025-07-24 RX ADMIN — SODIUM CHLORIDE, PRESERVATIVE FREE 10 ML: 5 INJECTION INTRAVENOUS at 20:32

## 2025-07-24 RX ADMIN — ENOXAPARIN SODIUM 30 MG: 100 INJECTION SUBCUTANEOUS at 08:55

## 2025-07-24 RX ADMIN — DEXTROSE: 5 SOLUTION INTRAVENOUS at 07:05

## 2025-07-24 RX ADMIN — TOLVAPTAN 15 MG: 15 TABLET ORAL at 14:47

## 2025-07-24 RX ADMIN — DEXTROSE: 5 SOLUTION INTRAVENOUS at 00:51

## 2025-07-24 RX ADMIN — DESMOPRESSIN ACETATE 2 MCG: 4 INJECTION, SOLUTION INTRAVENOUS; SUBCUTANEOUS at 06:02

## 2025-07-24 RX ADMIN — EZETIMIBE 10 MG: 10 TABLET ORAL at 08:56

## 2025-07-24 ASSESSMENT — PAIN SCALES - GENERAL
PAINLEVEL_OUTOF10: 0

## 2025-07-24 ASSESSMENT — ENCOUNTER SYMPTOMS
BLOOD IN STOOL: 0
VOMITING: 0
DIARRHEA: 0
SHORTNESS OF BREATH: 0
NAUSEA: 0
ABDOMINAL PAIN: 0
CHEST TIGHTNESS: 0

## 2025-07-24 NOTE — CARE COORDINATION
Case Management Assessment  Initial Evaluation    Date/Time of Evaluation: 7/24/2025 11:50 AM  Assessment Completed by: Ishan Manzano RN    If patient is discharged prior to next notation, then this note serves as note for discharge by case management.    Patient Name: Micaela Castro                   YOB: 1960  Diagnosis: Hyponatremia [E87.1]                   Date / Time: 7/23/2025 11:46 AM    Patient Admission Status: Inpatient   Readmission Risk (Low < 19, Mod (19-27), High > 27): Readmission Risk Score: 6.8    Current PCP: Juventino Faust MD  PCP verified by CM? Yes    Chart Reviewed: Yes      History Provided by: Patient, Medical Record  Patient Orientation: Alert and Oriented    Patient Cognition: Alert    Hospitalization in the last 30 days (Readmission):  No    If yes, Readmission Assessment in  Navigator will be completed and shown below.          Advance Directives:      Code Status: Full Code   Patient's Primary Decision Maker is: Legal Next of Kin      Discharge Planning:    Patient lives with: Spouse/Significant Other Type of Home: House  Primary Care Giver: Self  Patient Support Systems include: Spouse/Significant Other, Family Members   Current Financial resources:    Current community resources:    Current services prior to admission: None            Current DME:              Type of Home Care services:  None    ADLS  Prior functional level: Independent in ADLs/IADLs  Current functional level: Independent in ADLs/IADLs    PT AM-PAC:   /24  OT AM-PAC:   /24    Family can provide assistance at DC: Yes  Would you like Case Management to discuss the discharge plan with any other family members/significant others, and if so, who? No  Plans to Return to Present Housing: Yes  Other Identified Issues/Barriers to RETURNING to current housing: medical complications  Potential Assistance needed at discharge: N/A            Potential DME:    Patient expects to discharge to: House  Plan for

## 2025-07-24 NOTE — PLAN OF CARE
Problem: Discharge Planning  Goal: Discharge to home or other facility with appropriate resources  7/23/2025 2327 by Xavier Jacome RN  Outcome: Progressing  7/23/2025 1824 by Tylor Bush RN  Outcome: Progressing     Problem: Pain  Goal: Verbalizes/displays adequate comfort level or baseline comfort level  7/23/2025 2327 by Xavier Jacome RN  Outcome: Progressing  7/23/2025 1824 by Tylor Bush RN  Outcome: Progressing     Problem: Safety - Adult  Goal: Free from fall injury  Outcome: Progressing     Problem: Skin/Tissue Integrity  Goal: Skin integrity remains intact  Description: 1.  Monitor for areas of redness and/or skin breakdown  2.  Assess vascular access sites hourly  3.  Every 4-6 hours minimum:  Change oxygen saturation probe site  4.  Every 4-6 hours:  If on nasal continuous positive airway pressure, respiratory therapy assess nares and determine need for appliance change or resting period  Outcome: Progressing     Problem: Infection - Adult  Goal: Absence of infection at discharge  Outcome: Progressing  Goal: Absence of infection during hospitalization  Outcome: Progressing     Problem: Metabolic/Fluid and Electrolytes - Adult  Goal: Electrolytes maintained within normal limits  Outcome: Progressing  Goal: Hemodynamic stability and optimal renal function maintained  Outcome: Progressing

## 2025-07-24 NOTE — PROGRESS NOTES
Ph: [phone], Fax: [phone]           [facility]               Reason for admission:                 Unsteadiness and headache.    Brief Summary :     Micaela Castro is being seen by nephrology for severe hyponatremia.      Interval History and plan:      Blood pressure is well controlled   Urine is 1550 ml   Sodium is 113 > 122 > 121   Over night events noted with one episode of vasovagal  CT head was OK    Plan:    Urine osmolality is dilute with sodium of 66. ( High ADH in the face of increase fluid intake at home )  Serum osmolality is appropriately low   Give D5W as sodium corrected faster then planned. She received DDAVP x1 overnight to slow down the rapid increase of sodium   Continue  amlodipine 5 mg once a day.  Discontinue chlorthalidone.                   Assessment :     Hyponatremia: On arrival sodium was 113.  She had intermittent hyponatremia in the past.  Earlier this month her sodium was 134.    Serum glucose is normal.  I will check urine studies for SIADH although it appears that it is from chlorthalidone.  Since she has CNS changes earlier in the day she should be admitted in the ICU.  I will monitor sodium every 4 hour    Accelerated hypertension: Blood pressure is high and she stopped chlorthalidone.  Will start amlodipine and titrate as needed.           [facility] would like to thank Lance Wooten MD   for opportunity to serve this patient      Please call with questions at-   24 Hrs Answering service [phone] or  7 am- 5 pm via Perfect serve or cell phone  Dr.Muhammad Jeff Gamboa MD       HPI :     Micaela Castro is a 64 y.o. female presented to   the hospital on 7/23/2025 with unsteadiness and headache.    She has past medical history of depression, diverticulitis, GERD, osteoarthritis with chronic right shoulder pain.  She is status post colon surgery due to an abscess.  She occasionally has vertigo and takes meclizine.  She sees Dr. Dyer from  gastroenterology for colon polyps and screening.  She is status post partial colectomy.  Recently she was hypertensive and was started on chlorthalidone.    She did not feel well after starting chlorthalidone including unsteadiness and headache.  She reported in ER that she was feeling like off balance and her blood pressure was high.  Upon arrival in the ER systolic blood pressure was greater than 180. Of note sodium was noted to be 113 with a potassium of 3.9 and creatinine of 0.5.    I was consulted for further management of hyponatremia.      PMH/PSH/SH/Family History:     Past Medical History:   Diagnosis Date    Depression     Diverticulitis     GERD (gastroesophageal reflux disease)     Other diseases of nasal cavity and sinuses(858.19)           Medication:     Current Facility-Administered Medications: dextrose 5 % solution, , IntraVENous, Continuous  amLODIPine (NORVASC) tablet 5 mg, 5 mg, Oral, Daily  sodium chloride flush 0.9 % injection 5-40 mL, 5-40 mL, IntraVENous, 2 times per day  sodium chloride flush 0.9 % injection 5-40 mL, 5-40 mL, IntraVENous, PRN  0.9 % sodium chloride infusion, , IntraVENous, PRN  enoxaparin Sodium (LOVENOX) injection 30 mg, 30 mg, SubCUTAneous, BID  ondansetron (ZOFRAN-ODT) disintegrating tablet 4 mg, 4 mg, Oral, Q8H PRN **OR** ondansetron (ZOFRAN) injection 4 mg, 4 mg, IntraVENous, Q6H PRN  polyethylene glycol (GLYCOLAX) packet 17 g, 17 g, Oral, Daily PRN  acetaminophen (TYLENOL) tablet 650 mg, 650 mg, Oral, Q6H PRN **OR** acetaminophen (TYLENOL) suppository 650 mg, 650 mg, Rectal, Q6H PRN  ezetimibe (ZETIA) tablet 10 mg, 10 mg, Oral, Daily  montelukast (SINGULAIR) tablet 10 mg, 10 mg, Oral, Daily  meclizine (ANTIVERT) tablet 25 mg, 25 mg, Oral, Nightly  FLUoxetine (PROZAC) capsule 10 mg, 10 mg, Oral, Nightly    Vitals :     Vitals:    07/24/25 0500   BP: (!) 144/106   Pulse: 63   Resp: 14   Temp:    SpO2: 100%          Physical Examination :     appearance: Alert,

## 2025-07-24 NOTE — CONSULTS
ICU CONSULT     Admit Date:  7/23/2025                            Hospital Day: 2  ICU Day: 13h    CC: dizziness    Reason for consult: severe hyponatremia     History obtained from:  chart review and the patient    INTERVAL EVENTS     Overnight, sodium over corrected to 122. Patient was given dose of DDAVP and started on 75 mL / hr of D5W with q2h Na checks. Plan to d/c D5W when at goal Na (120). Had witnessed syncopal episode 30 sec after insertion of PIV. Patient had no convulsions, no post-ictal state, and bit her tongue. Patient endorsed feeling nauseous and sweaty, with decreased BP and HR. Likely vasovagal episode vs seizure.     This morning, patient is Aox4 and reports feeling \"groggy\". Patient reports a generalized headache and some mental slowing, denies vision changes, nausea and emesis , diarrhea, urinary urgency/dysuria/retention.       BRIEF PLAN     Hold IVF  Continue to monitor for signs & symptoms of hyponatremia   Slowly correct Na with goal of 128    SUBJECTIVE   HPI:  Micaela Castro is a 64 y.o. female with pmhx of newly diagnosed HTN, HLD, MDD, vertigo presented to [facility] ED on 07/23/25 with dizziness. Last week (07/14/25) patient was started on chlorthalidone 25 mg qd by her PCP for newly diagnosed HTN. Patient reports she started getting flashes of light and experienced some cognitive slowing with speaking, starting around a week ago. Patient stopped the chlorthalidone on Sunday (07/20/25), but continued to have symptoms and decided to go to the ED.     In the ED she was found to have no neuro deficit, NIH 0, Na of 116. Given 1 L of LR, started on amlodipine, 75 mL / hr normal saline. CT Head WO & CTA Head / Neck were unremarkable. Patient reports she has never had episodes like this before. Patient was hypertensive to SBP 170s, states her blood pressure at home is typically in the 130-140s, with occasional readings as high as 170/120. During interview patient endorsed low urine output, stating  Ramone, DO      CTA HEAD NECK W CONTRAST   Final Result      No stenosis, significant plaque or dissection of the bilateral vertebral or   carotid arteries within the neck.      No large vessel intracranial occlusion or significant intracranial stenosis.         Electronically signed by Karlene Taveras          EK25: Normal sinus rhythm     ECHO:   none      ASSESSMENT & PLAN     Micaela Castro is a 64 y.o. female with pmhx of newly diagnosed HTN, HLD, MDD, vertigo presented to [facility] ED on 25 with dizziness in the setting of recently starting (25) chlorthalidone 25 mg qd by her PCP for newly diagnosed HTN. Patient reported vision and cognitive changes in addition to dizziness     Neuro  #Vertigo  Patient denies loss of consciousness or focal neurological deficits, NIH 0. Low suspicion for stroke or Amaurosis Fugax in the setting of well controlled lipids, no afib, global vs focal signs & symptoms, CT Head WO & CT Head/Neck unremarkable. Loss of vision described as \"daggers coming in from the sides\" in both eyes.   Home meclizine 10 mg qd     Pulmonary  #Allergies  Home montelukast 10 mg qd     Cardiovascular  #HLD - well controlled recent (25) lipid panel wnl  #HTN - chronic NSAID (ibuprofen 1200 mg qd)  Patient denies syncopal episodes or chest pain, trop 6, BNP 66  Home ezetimibe 10 mg qd  Start amlodipine 5 mg qd     GI  Reports two bouts of diarrhea on Monday (25), no blood.  Diet: No diet orders on file     Renal   #Severe Hyponatremia (Na 114)  Presented to ED with Na of 114, improved to 116 with isotonic IVF, patient admitted to ICU in the setting of severe hyponatremia (<120) with CNS signs & symptoms. Night of (25) Na over corrected to 122, status post DDAVP x 1 & 75 mL / hr of D5W with q2h Na checks. Plan to d/c D5W when at goal Na (120). Had witnessed syncopal episode 30 sec after insertion of PIV, no convulsions, no post-ictal state, bit her tongue - endorsed nausea,

## 2025-07-24 NOTE — PROGRESS NOTES
V2.0    [facility] Progress Note      Name:  Micaela Castro /Age/Sex: 1960  (64 y.o. female)   MRN & CSN:  [id number] & [id number] Encounter Date/Time: 2025 9:37 AM EDT   Location:  4506/4506-01 PCP: Juventino Faust MD     Attending:Lance Wooten MD       Hospital Day: 2    Assessment and Recommendations     Patient Active Problem List   Diagnosis    Acute diverticulitis    Acute diverticulitis    Diverticulitis of intestine with perforation and abscess    Depression    GERD (gastroesophageal reflux disease)    Hyponatremia     63-year-old lady known case of history of diverticulosis/diverticulitis, depression, GERD, and hypertension presented with feeling unwell.  She stated she was not feeling right confirmed that she was off balance.  She was seen some lightening bolts in her vision.  She was having dizziness, headache, and mild confusion.     She was started on chlorthalidone last week and started a few days ago.  In the ER her blood pressure was elevated.  Her sodium was 113.  Other workup including brain imaging were okay.     Severe hyponatremia  - Seems related to recent chlorthalidone and poor oral intake  - U osm 262, U Na 66. Plasma osm 246  - Admitted to the ICU. Started on normal saline.  Na improved to 122 over few hours so she was started on D5 and given DDAVP. Around 2 AM she had what seems like a seizure episode (she lost consciousness and bit her tongue )  - Na 118 this morning  . Will discuss with nephrology  - Stopped chlorthalidone/thiazides indefinitely     Hx of diverticulosis/diverticulitis  Depression  GERD  Hypertension  -Started on amlodipine   -Cont other home meds      DVT Ppx:Lovenox    Code: Full      Medical Decision Making:  The following items were considered in medical decision making:  Discussion of patient care with other providers. Consultant Notes reviewed  Reviewed clinical lab tests Independently if any  Microbiology cultures and other micro tests if any  Reviewed

## 2025-07-24 NOTE — PROGRESS NOTES
02:15 A new peripheral IV inserted.  02:17 Patient had an brief episode of seizure (7 seconds), she lost  consciousness and  bit her tongue. Staff assist button was used to call for help. ICU resident at bedside. This RN was at bedside during this event. Bradycardia and hypotension noted. Patient kept under close monitoring. SPO2 was fluctuating 89-91, NC 2 L/min applied.  This RN stayed at bedside for 60 minutes after the episode until patient's BP and HR stabilized.

## 2025-07-24 NOTE — PLAN OF CARE
Problem: Discharge Planning  Goal: Discharge to home or other facility with appropriate resources  Outcome: Progressing     Problem: Pain  Goal: Verbalizes/displays adequate comfort level or baseline comfort level  Outcome: Progressing  Flowsheets  Taken 7/24/2025 1200  Verbalizes/displays adequate comfort level or baseline comfort level: Assess pain using appropriate pain scale  Taken 7/24/2025 0800  Verbalizes/displays adequate comfort level or baseline comfort level: Assess pain using appropriate pain scale     Problem: Safety - Adult  Goal: Free from fall injury  Outcome: Progressing     Problem: Skin/Tissue Integrity  Goal: Skin integrity remains intact  Description: 1.  Monitor for areas of redness and/or skin breakdown  2.  Assess vascular access sites hourly  3.  Every 4-6 hours minimum:  Change oxygen saturation probe site  4.  Every 4-6 hours:  If on nasal continuous positive airway pressure, respiratory therapy assess nares and determine need for appliance change or resting period  Outcome: Progressing  Flowsheets  Taken 7/24/2025 1200  Skin Integrity Remains Intact: Monitor for areas of redness and/or skin breakdown  Taken 7/24/2025 0800  Skin Integrity Remains Intact: Monitor for areas of redness and/or skin breakdown     Problem: Infection - Adult  Goal: Absence of infection at discharge  Outcome: Progressing     Problem: Infection - Adult  Goal: Absence of infection during hospitalization  Outcome: Progressing     Problem: Metabolic/Fluid and Electrolytes - Adult  Goal: Electrolytes maintained within normal limits  Outcome: Progressing     Problem: Metabolic/Fluid and Electrolytes - Adult  Goal: Hemodynamic stability and optimal renal function maintained  Outcome: Progressing      Doxycycline Pregnancy And Lactation Text: This medication is Pregnancy Category D and not consider safe during pregnancy. It is also excreted in breast milk but is considered safe for shorter treatment courses.

## 2025-07-25 LAB
ALBUMIN SERPL-MCNC: 4.3 G/DL (ref 3.4–5)
ANION GAP SERPL CALCULATED.3IONS-SCNC: 13 MMOL/L (ref 3–16)
BASOPHILS # BLD: 0 K/UL (ref 0–0.2)
BASOPHILS NFR BLD: 0.4 %
BUN SERPL-MCNC: 7 MG/DL (ref 7–20)
CALCIUM SERPL-MCNC: 9.6 MG/DL (ref 8.3–10.6)
CHLORIDE SERPL-SCNC: 89 MMOL/L (ref 99–110)
CO2 SERPL-SCNC: 24 MMOL/L (ref 21–32)
CREAT SERPL-MCNC: 0.6 MG/DL (ref 0.6–1.2)
DEPRECATED RDW RBC AUTO: 13.6 % (ref 12.4–15.4)
EOSINOPHIL # BLD: 0.1 K/UL (ref 0–0.6)
EOSINOPHIL NFR BLD: 0.8 %
GFR SERPLBLD CREATININE-BSD FMLA CKD-EPI: >90 ML/MIN/{1.73_M2}
GLUCOSE SERPL-MCNC: 110 MG/DL (ref 70–99)
HCT VFR BLD AUTO: 41.6 % (ref 36–48)
HGB BLD-MCNC: 14.9 G/DL (ref 12–16)
LYMPHOCYTES # BLD: 2.4 K/UL (ref 1–5.1)
LYMPHOCYTES NFR BLD: 21.8 %
MAGNESIUM SERPL-MCNC: 2.23 MG/DL (ref 1.8–2.4)
MCH RBC QN AUTO: 29.2 PG (ref 26–34)
MCHC RBC AUTO-ENTMCNC: 35.7 G/DL (ref 31–36)
MCV RBC AUTO: 81.9 FL (ref 80–100)
MONOCYTES # BLD: 0.7 K/UL (ref 0–1.3)
MONOCYTES NFR BLD: 6.2 %
NEUTROPHILS # BLD: 7.6 K/UL (ref 1.7–7.7)
NEUTROPHILS NFR BLD: 70.8 %
PHOSPHATE SERPL-MCNC: 3 MG/DL (ref 2.5–4.9)
PLATELET # BLD AUTO: 295 K/UL (ref 135–450)
PMV BLD AUTO: 5.8 FL (ref 5–10.5)
POTASSIUM SERPL-SCNC: 3.7 MMOL/L (ref 3.5–5.1)
RBC # BLD AUTO: 5.08 M/UL (ref 4–5.2)
SODIUM SERPL-SCNC: 125 MMOL/L (ref 136–145)
SODIUM SERPL-SCNC: 126 MMOL/L (ref 136–145)
SODIUM SERPL-SCNC: 130 MMOL/L (ref 136–145)
SODIUM SERPL-SCNC: 131 MMOL/L (ref 136–145)
WBC # BLD AUTO: 10.8 K/UL (ref 4–11)

## 2025-07-25 PROCEDURE — 6370000000 HC RX 637 (ALT 250 FOR IP): Performed by: HOSPITALIST

## 2025-07-25 PROCEDURE — 80069 RENAL FUNCTION PANEL: CPT

## 2025-07-25 PROCEDURE — 99233 SBSQ HOSP IP/OBS HIGH 50: CPT | Performed by: INTERNAL MEDICINE

## 2025-07-25 PROCEDURE — 6370000000 HC RX 637 (ALT 250 FOR IP)

## 2025-07-25 PROCEDURE — 36415 COLL VENOUS BLD VENIPUNCTURE: CPT

## 2025-07-25 PROCEDURE — 6370000000 HC RX 637 (ALT 250 FOR IP): Performed by: INTERNAL MEDICINE

## 2025-07-25 PROCEDURE — 2500000003 HC RX 250 WO HCPCS

## 2025-07-25 PROCEDURE — 6360000002 HC RX W HCPCS

## 2025-07-25 PROCEDURE — 2060000000 HC ICU INTERMEDIATE R&B

## 2025-07-25 PROCEDURE — 84295 ASSAY OF SERUM SODIUM: CPT

## 2025-07-25 PROCEDURE — 83735 ASSAY OF MAGNESIUM: CPT

## 2025-07-25 PROCEDURE — 85025 COMPLETE CBC W/AUTO DIFF WBC: CPT

## 2025-07-25 RX ORDER — MIRTAZAPINE 15 MG/1
15 TABLET, FILM COATED ORAL NIGHTLY
Status: DISCONTINUED | OUTPATIENT
Start: 2025-07-25 | End: 2025-07-26 | Stop reason: HOSPADM

## 2025-07-25 RX ORDER — TOLVAPTAN 15 MG/1
15 TABLET ORAL ONCE
Status: COMPLETED | OUTPATIENT
Start: 2025-07-25 | End: 2025-07-25

## 2025-07-25 RX ADMIN — ENOXAPARIN SODIUM 30 MG: 100 INJECTION SUBCUTANEOUS at 08:33

## 2025-07-25 RX ADMIN — MECLIZINE HYDROCHLORIDE 25 MG: 25 TABLET ORAL at 21:26

## 2025-07-25 RX ADMIN — MIRTAZAPINE 15 MG: 15 TABLET, FILM COATED ORAL at 21:27

## 2025-07-25 RX ADMIN — TOLVAPTAN 15 MG: 15 TABLET ORAL at 09:20

## 2025-07-25 RX ADMIN — SODIUM CHLORIDE, PRESERVATIVE FREE 10 ML: 5 INJECTION INTRAVENOUS at 08:33

## 2025-07-25 RX ADMIN — MONTELUKAST 10 MG: 10 TABLET, FILM COATED ORAL at 08:33

## 2025-07-25 RX ADMIN — ENOXAPARIN SODIUM 30 MG: 100 INJECTION SUBCUTANEOUS at 21:28

## 2025-07-25 RX ADMIN — AMLODIPINE BESYLATE 5 MG: 5 TABLET ORAL at 08:33

## 2025-07-25 RX ADMIN — EZETIMIBE 10 MG: 10 TABLET ORAL at 08:37

## 2025-07-25 RX ADMIN — SODIUM CHLORIDE, PRESERVATIVE FREE 10 ML: 5 INJECTION INTRAVENOUS at 21:28

## 2025-07-25 ASSESSMENT — ENCOUNTER SYMPTOMS
DIARRHEA: 0
NAUSEA: 0
ABDOMINAL PAIN: 0
SHORTNESS OF BREATH: 0
CHEST TIGHTNESS: 0
BLOOD IN STOOL: 0
VOMITING: 0

## 2025-07-25 NOTE — PLAN OF CARE
ICU to Wards Transfer  Internal Medicine Wards Acceptance Note   [facility]      The ICU-PAUSE transfer documentation has been acknowledged and reviewed by the wards team.  Additionally, the wards team has received official sign-out from the ICU team with acceptance of care of the patient.      At the Time of transfer, patient was seen and examined by the wards team with the findings below:    Physical Exam  Constitutional:       Appearance: Normal appearance. She is not ill-appearing or toxic-appearing.   HENT:      Head: Normocephalic and atraumatic.   Cardiovascular:      Rate and Rhythm: Normal rate and regular rhythm.      Pulses: Normal pulses.   Pulmonary:      Effort: Pulmonary effort is normal.   Neurological:      General: No focal deficit present.   Psychiatric:         Mood and Affect: Mood normal.       Updates to Assessment & Plan at this time:    # Severe Hyponatremia 2/2 thiazide medication vs SIADH  118 on presentation, now 126.   - S/p 2 doses samsca (7/24, 7/25)  - urine studies - Na 66, osm 262, cr 25.7, serum osm 246   - Will now monitor q6h   - Watch for overcorrection - no more than 134 by tomorrow am  - no thiazides on dc  - anticipate discharge in next 24-48 hours    # HTN; controlled  - Cont norvasc 5 daily  - ctm    # Depression  - started Remeron and d/c home fluoxetine due to concern for siadh      Hospitalist assigned: Dr. Je Jackson, DO  Internal Medicine PGY3  07/25/25

## 2025-07-25 NOTE — TRANSITION OF CARE
-[facility] Internal Medicine-  -ICU to [place] Transfer Note-  HPI from H&P  Micaela Castro is a 64 y.o. female with pmhx of newly diagnosed HTN, HLD, MDD, vertigo presented to [facility] ED on 07/23/25 with dizziness. Last week (07/14/25) patient was started on chlorthalidone 25 mg qd by her PCP for newly diagnosed HTN. Patient reports she started getting flashes of light and experienced some cognitive slowing with speaking, starting around a week ago. Patient stopped the chlorthalidone on Sunday (07/20/25), but continued to have symptoms and decided to go to the ED.     In the ED she was found to have no neuro deficit, NIH 0, Na of 116. Given 1 L of LR, started on amlodipine, 75 mL / hr normal saline. CT Head WO & CTA Head / Neck were unremarkable. Patient reports she has never had episodes like this before. Patient was hypertensive to SBP 170s, states her blood pressure at home is typically in the 130-140s, with occasional readings as high as 170/120. During interview patient endorsed low urine output, stating she felt like \"I havent been able to go all the way\", which is new for her. Patient was admitted to ICU for management of severe hyponatremia (<120) with CNS signs & symptoms.     I  ICU Admission Reason & Brief ICU Course:     Micaela Castro is a 64 y.o. female with pmhx of newly diagnosed HTN, HLD, MDD, vertigo presented to [facility] ED on 07/23/25 with dizziness in the setting of recently starting (07/14/25) chlorthalidone 25 mg qd by her PCP for newly diagnosed HTN. Patient reported vision and cognitive changes in addition to dizziness     Neuro  #Vertigo  Patient denies loss of consciousness or focal neurological deficits, NIH 0. Low suspicion for stroke or Amaurosis Fugax in the setting of well controlled lipids, no afib, global vs focal signs & symptoms, CT Head WO & CT Head/Neck unremarkable. Loss of vision described as \"daggers coming in from the sides\" in both eyes.   Home meclizine 10 mg qd

## 2025-07-25 NOTE — PLAN OF CARE
Problem: Discharge Planning  Goal: Discharge to home or other facility with appropriate resources  7/25/2025 0946 by Gomez Saleem RN  Outcome: Progressing  Flowsheets (Taken 7/25/2025 0800)  Discharge to home or other facility with appropriate resources:   Identify barriers to discharge with patient and caregiver   Arrange for needed discharge resources and transportation as appropriate   Identify discharge learning needs (meds, wound care, etc)   Arrange for interpreters to assist at discharge as needed   Refer to discharge planning if patient needs post-hospital services based on physician order or complex needs related to functional status, cognitive ability or social support system     Problem: Pain  Goal: Verbalizes/displays adequate comfort level or baseline comfort level  7/25/2025 0946 by Gomez Saleem RN  Outcome: Progressing     Problem: Safety - Adult  Goal: Free from fall injury  7/25/2025 0946 by Gomez Saleem RN  Outcome: Progressing     Problem: Skin/Tissue Integrity  Goal: Skin integrity remains intact  Description: 1.  Monitor for areas of redness and/or skin breakdown  2.  Assess vascular access sites hourly  3.  Every 4-6 hours minimum:  Change oxygen saturation probe site  4.  Every 4-6 hours:  If on nasal continuous positive airway pressure, respiratory therapy assess nares and determine need for appliance change or resting period  7/25/2025 0946 by Gomez Saleem RN  Outcome: Progressing  Flowsheets  Taken 7/25/2025 0800 by Gomez Saleem, RN  Skin Integrity Remains Intact:   Monitor for areas of redness and/or skin breakdown   Assess vascular access sites hourly   Every 4-6 hours minimum:  Change oxygen saturation probe site   Every 4-6 hours:  If on nasal continuous positive airway pressure, assess nares and determine need for appliance change or resting period   Assess need for specialty bed   Positioning devices  Taken 7/25/2025 0400 by Serenity Bonds RN  Skin Integrity Remains

## 2025-07-25 NOTE — PROGRESS NOTES
V2.0    [facility] Progress Note      Name:  Micaela Castro /Age/Sex: 1960  (65 y.o. female)   MRN & CSN:  [id number] & [id number] Encounter Date/Time: 2025 9:37 AM EDT   Location:  [facility]6/4506-01 PCP: Juventino Faust MD     Attending:Lance Wooten MD       Hospital Day: 3    Assessment and Recommendations     Patient Active Problem List   Diagnosis    Acute diverticulitis    Acute diverticulitis    Diverticulitis of intestine with perforation and abscess    Depression    GERD (gastroesophageal reflux disease)    Hyponatremia     63-year-old lady known case of history of diverticulosis/diverticulitis, depression, GERD, and hypertension presented with feeling unwell.  She stated she was not feeling right confirmed that she was off balance.  She was seen some lightening bolts in her vision.  She was having dizziness, headache, and mild confusion.     She was started on chlorthalidone last week and started a few days ago.  In the ER her blood pressure was elevated.  Her sodium was 113.  Other workup including brain imaging were okay.     Severe hyponatremia  - Seems related to recent chlorthalidone and poor oral intake. Also possible SIADH from prozac  - U osm 262, U Na 66. Plasma osm 246  - Admitted to the ICU. Started on normal saline.  Na improved to 122 over few hours so she was started on D5 and given DDAVP. Around 2 AM she had what seems like a seizure episode per nurse (she lost consciousness and bit her tongue )  - Received a dose of Tolvaptan . Na 126 today.plan for another dose of Tolvaptan today  - Stopped chlorthalidone/thiazides indefinitely   - Prozac stopped . Will switch to Mirtazapine     Hx of diverticulosis/diverticulitis  Depression  GERD  Hypertension  -Started on amlodipine   -Cont other home meds      DVT Ppx:Lovenox    Code: Full      Medical Decision Making:  The following items were considered in medical decision making:  Discussion of patient care with other providers. Consultant  AM    [facility]  [x] High (any 2 of A, B, or C)    A. Problems (any 1)  [x] Acute/Chronic Illness/injury posing threat to life or bodily function:    [] Severe exacerbation of chronic illness:    ---------------------------------------------------------------------  B. Risk of Treatment (any 1)   [] IV ABX requiring serial renal monitoring for nephrotoxicity:     [] IV Narcotic analgesia for adverse drug reaction  [] IV diuresis requiring serial monitoring for renal impairment and electrolyte derangements  [x] Critical electrolyte abnormalities requiring IV replacement and close serial monitoring  [] Insulin - monitoring serial FSBS for Hypoglycemic adverse drug reaction  [] Anticoagulation requiring serial monitoring of coagulation factors  [] IV/IM Controlled Substances order (any 1)   [] One-time Order including Drug Name/Route, Reason Ordered:   [] Scheduled Order including Drug Name/Route, Reason Ordered or Continued:   [] PRN Order including Drug Name/Route, Reason Ordered or Continued:  Other -   [] Decision to De-escalate Care this DOS due to change in treatment goals:  [] Decision to Escalate Care To:   [] Major Surgery/Procedure (any 1):   Elective with patient risk factors including Procedure Type and Risk Factors:   Emergent Procedure Type:    ----------------------------------------------------------------------  C. Data (any 2)  [x] Data Review (3+ points)  [x] Consultant Note reviewed (1 point each)  [x] All current labs were reviewed and interpreted for clinical significance   [x] All available studies Reviewed (1 point each):   [x] Collateral history obtained from chart and family as available (Max 1 point):  [] Independent (Lance Wooten MD) Interpretation of tests (any 1)  [] Rhythm Strip (Telemetry) personally reviewed and interpreted as documented above    [] Imaging personally reviewed and interpreted, includes:    [x] Discussion (any 1)  [x] Discussed the discharge plan in detail with case

## 2025-07-25 NOTE — PROGRESS NOTES
ICU Progress Note     Admit Date:  7/23/2025                            Hospital Day: 3  ICU Day: 1d 15h    CC: dizziness    Reason for consult: severe hyponatremia     History obtained from:  chart review and the patient    INTERVAL EVENTS     Overnight, sodium over corrected to 126. No acute events     This morning, patient is Aox4 and reports feeling better. Denies HA, nausea and emesis, diarrhea, vision changes. Asking when she can get out of the hospital as it [birth year].       BRIEF PLAN     Downgrade    SUBJECTIVE   HPI:  Micaela Castro is a 64 y.o. female with pmhx of newly diagnosed HTN, HLD, MDD, vertigo presented to [facility] ED on 07/23/25 with dizziness. Last week (07/14/25) patient was started on chlorthalidone 25 mg qd by her PCP for newly diagnosed HTN. Patient reports she started getting flashes of light and experienced some cognitive slowing with speaking, starting around a week ago. Patient stopped the chlorthalidone on Sunday (07/20/25), but continued to have symptoms and decided to go to the ED.     In the ED she was found to have no neuro deficit, NIH 0, Na of 116. Given 1 L of LR, started on amlodipine, 75 mL / hr normal saline. CT Head WO & CTA Head / Neck were unremarkable. Patient reports she has never had episodes like this before. Patient was hypertensive to SBP 170s, states her blood pressure at home is typically in the 130-140s, with occasional readings as high as 170/120. During interview patient endorsed low urine output, stating she felt like \"I havent been able to go all the way\", which is new for her. Patient was admitted to ICU for management of severe hyponatremia (<120) with CNS signs & symptoms.    ROS  Review of Systems   Constitutional:  Negative for diaphoresis and fatigue.   Eyes:  Negative for visual disturbance.   Respiratory:  Negative for chest tightness and shortness of breath.    Cardiovascular:  Negative for chest pain and palpitations.   Gastrointestinal:  Negative    none      ASSESSMENT & PLAN     Micaela Castro is a 64 y.o. female with pmhx of newly diagnosed HTN, HLD, MDD, vertigo presented to [facility] ED on 07/23/25 with dizziness in the setting of recently starting (07/14/25) chlorthalidone 25 mg qd by her PCP for newly diagnosed HTN. Patient reported vision and cognitive changes in addition to dizziness     Neuro  #Vertigo  Patient denies loss of consciousness or focal neurological deficits, NIH 0. Low suspicion for stroke or Amaurosis Fugax in the setting of well controlled lipids, no afib, global vs focal signs & symptoms, CT Head WO & CT Head/Neck unremarkable. Loss of vision described as \"daggers coming in from the sides\" in both eyes.   Home meclizine 10 mg qd     Pulmonary  #Allergies  Home montelukast 10 mg qd     Cardiovascular  #HLD - well controlled recent (07/14/25) lipid panel wnl  #HTN - chronic NSAID (ibuprofen 1200 mg qd)  Patient denies syncopal episodes or chest pain, trop 6, BNP 66  Home ezetimibe 10 mg qd  Start amlodipine 5 mg qd     GI  Reports two bouts of diarrhea on Monday (07/21/25), no blood.  Diet: No diet orders on file     Renal   #Severe Hyponatremia (Na 114)  Presented to ED with Na of 114, improved to 116 with isotonic IVF, patient admitted to ICU in the setting of severe hyponatremia (<120) with CNS signs & symptoms. Night of (07/23/25) Na over corrected to 122, status post DDAVP x 1 & 75 mL / hr of D5W with q2h Na checks. Plan to d/c D5W when at goal Na (120). Had witnessed syncopal episode 30 sec after insertion of PIV, no convulsions, no post-ictal state, bit her tongue - endorsed nausea, diaphoresis, with decreased BP and HR. Likely vasovagal episode vs seizure. Given one dose of DDAVP 07/23/25 PM  DDX: SIADH (hx thiazide, fluoxetine, NSAID uOSM 262, TSH wnl) vs HHS (+ polydipsia, - polyuria, bgl wnl) vs medication induced hyponatremia (chronic NSAID + recent thiazide usage correlating with s /s)   Urine Output: 2250  Nephrology following,

## 2025-07-25 NOTE — PROGRESS NOTES
she was hypertensive and was started on chlorthalidone.    She did not feel well after starting chlorthalidone including unsteadiness and headache.  She reported in ER that she was feeling like off balance and her blood pressure was high.  Upon arrival in the ER systolic blood pressure was greater than 180. Of note sodium was noted to be 113 with a potassium of 3.9 and creatinine of 0.5.    I was consulted for further management of hyponatremia.      PMH/PSH/SH/Family History:     Past Medical History:   Diagnosis Date    Depression     Diverticulitis     GERD (gastroesophageal reflux disease)     Other diseases of nasal cavity and sinuses(478.19)           Medication:     Current Facility-Administered Medications: amLODIPine (NORVASC) tablet 5 mg, 5 mg, Oral, Daily  sodium chloride flush 0.9 % injection 5-40 mL, 5-40 mL, IntraVENous, 2 times per day  sodium chloride flush 0.9 % injection 5-40 mL, 5-40 mL, IntraVENous, PRN  0.9 % sodium chloride infusion, , IntraVENous, PRN  enoxaparin Sodium (LOVENOX) injection 30 mg, 30 mg, SubCUTAneous, BID  ondansetron (ZOFRAN-ODT) disintegrating tablet 4 mg, 4 mg, Oral, Q8H PRN **OR** ondansetron (ZOFRAN) injection 4 mg, 4 mg, IntraVENous, Q6H PRN  polyethylene glycol (GLYCOLAX) packet 17 g, 17 g, Oral, Daily PRN  acetaminophen (TYLENOL) tablet 650 mg, 650 mg, Oral, Q6H PRN **OR** acetaminophen (TYLENOL) suppository 650 mg, 650 mg, Rectal, Q6H PRN  ezetimibe (ZETIA) tablet 10 mg, 10 mg, Oral, Daily  montelukast (SINGULAIR) tablet 10 mg, 10 mg, Oral, Daily  meclizine (ANTIVERT) tablet 25 mg, 25 mg, Oral, Nightly    Vitals :     Vitals:    07/25/25 0714   BP: (!) 155/85   Pulse: 75   Resp: 19   Temp:    SpO2:           Physical Examination :     appearance: Alert, orientated  Respiratory: no distress  Cardiovascular: no visibly  raised JVD, Edema no  Abdomen: -  soft       Labs :     CBC:   Recent Labs     07/23/25  1232 07/24/25  0527 07/25/25  0235   WBC 8.3 9.8 10.8   HGB 14.0  15.1 14.9   HCT 38.9 42.4 41.6    260 295     BMP:    Recent Labs     07/23/25  1445 07/23/25  1740 07/24/25  0527 07/24/25  0809 07/24/25  1840 07/24/25  2155 07/25/25  0235   *   < > 121*   < > 120* 121* 126*  125*   K 3.9  --  3.6  --   --   --  3.7   CL 81*  --  86*  --   --   --  89*   CO2 24  --  24  --   --   --  24   BUN 7  --  8  --   --   --  7   CREATININE 0.5*  --  0.6  --   --   --  0.6   GLUCOSE 96  --  115*  --   --   --  110*   MG  --   --  2.09  --   --   --  2.23   PHOS  --   --  2.8  --   --   --  3.0    < > = values in this interval not displayed.     Lab Results   Component Value Date/Time    COLORU Yellow 07/23/2025 12:32 PM    NITRU Negative 07/23/2025 12:32 PM    GLUCOSEU Negative 07/23/2025 12:32 PM    KETUA TRACE 07/23/2025 12:32 PM    UROBILINOGEN 0.2 07/23/2025 12:32 PM    BILIRUBINUR Negative 07/23/2025 12:32 PM        ----------------------------------------------------------  Please call with questions at      24 Hrs Answering service [phone]  Perfect serve, or cell phone 7 am - 5pm  MD trina LinrologyBueeno

## 2025-07-25 NOTE — CARE COORDINATION
1:03 PM  Patient is from home with spouse. IPTA.   Monitoring Na lavels, downgrade out of ICU.   Per MD, plan to monitor over night then likely dc tomorrow  Pt would benefit from therapy evals prior to dc     CM following.     Electronically signed by Ishan Manzano RN, CM on 7/25/2025 at 1:04 PM.  Phone: [phone]  Fax: [phone]

## 2025-07-25 NOTE — PLAN OF CARE
Problem: Discharge Planning  Goal: Discharge to home or other facility with appropriate resources  7/25/2025 0026 by Serenity Bonds RN  Outcome: Progressing  7/24/2025 1352 by Talon Palmer RN  Outcome: Progressing     Problem: Pain  Goal: Verbalizes/displays adequate comfort level or baseline comfort level  7/25/2025 0026 by Serenity Bonds RN  Outcome: Progressing  Flowsheets (Taken 7/24/2025 1600 by Talon Palmer RN)  Verbalizes/displays adequate comfort level or baseline comfort level: Assess pain using appropriate pain scale  7/24/2025 1352 by Talon Palmer RN  Outcome: Progressing  Flowsheets  Taken 7/24/2025 1200  Verbalizes/displays adequate comfort level or baseline comfort level: Assess pain using appropriate pain scale  Taken 7/24/2025 0800  Verbalizes/displays adequate comfort level or baseline comfort level: Assess pain using appropriate pain scale     Problem: Safety - Adult  Goal: Free from fall injury  7/25/2025 0026 by Serenity Bonds RN  Outcome: Progressing  7/24/2025 1352 by Talon Palmer RN  Outcome: Progressing     Problem: Skin/Tissue Integrity  Goal: Skin integrity remains intact  Description: 1.  Monitor for areas of redness and/or skin breakdown  2.  Assess vascular access sites hourly  3.  Every 4-6 hours minimum:  Change oxygen saturation probe site  4.  Every 4-6 hours:  If on nasal continuous positive airway pressure, respiratory therapy assess nares and determine need for appliance change or resting period  7/25/2025 0026 by Serenity Bonds RN  Outcome: Progressing  Flowsheets  Taken 7/25/2025 0000 by Serenity Bonds RN  Skin Integrity Remains Intact: Monitor for areas of redness and/or skin breakdown  Taken 7/24/2025 2000 by Serenity Bonds RN  Skin Integrity Remains Intact: Monitor for areas of redness and/or skin breakdown  Taken 7/24/2025 1600 by Talon Palmer RN  Skin Integrity Remains Intact: Monitor for areas of redness and/or skin breakdown  7/24/2025 1352 by Talon Palmer

## 2025-07-26 VITALS
BODY MASS INDEX: 37.06 KG/M2 | DIASTOLIC BLOOD PRESSURE: 85 MMHG | SYSTOLIC BLOOD PRESSURE: 129 MMHG | HEART RATE: 74 BPM | OXYGEN SATURATION: 97 % | HEIGHT: 67 IN | WEIGHT: 236.11 LBS | RESPIRATION RATE: 14 BRPM | TEMPERATURE: 97.3 F

## 2025-07-26 LAB
ALBUMIN SERPL-MCNC: 4.1 G/DL (ref 3.4–5)
ANION GAP SERPL CALCULATED.3IONS-SCNC: 11 MMOL/L (ref 3–16)
BASOPHILS # BLD: 0 K/UL (ref 0–0.2)
BASOPHILS NFR BLD: 0.6 %
BUN SERPL-MCNC: 12 MG/DL (ref 7–20)
CALCIUM SERPL-MCNC: 9.6 MG/DL (ref 8.3–10.6)
CHLORIDE SERPL-SCNC: 95 MMOL/L (ref 99–110)
CO2 SERPL-SCNC: 26 MMOL/L (ref 21–32)
CREAT SERPL-MCNC: 0.7 MG/DL (ref 0.6–1.2)
DEPRECATED RDW RBC AUTO: 13.5 % (ref 12.4–15.4)
EOSINOPHIL # BLD: 0.1 K/UL (ref 0–0.6)
EOSINOPHIL NFR BLD: 1.6 %
GFR SERPLBLD CREATININE-BSD FMLA CKD-EPI: >90 ML/MIN/{1.73_M2}
GLUCOSE SERPL-MCNC: 109 MG/DL (ref 70–99)
HCT VFR BLD AUTO: 43.7 % (ref 36–48)
HGB BLD-MCNC: 15.3 G/DL (ref 12–16)
LYMPHOCYTES # BLD: 2 K/UL (ref 1–5.1)
LYMPHOCYTES NFR BLD: 23.7 %
MAGNESIUM SERPL-MCNC: 2.28 MG/DL (ref 1.8–2.4)
MCH RBC QN AUTO: 29.1 PG (ref 26–34)
MCHC RBC AUTO-ENTMCNC: 35 G/DL (ref 31–36)
MCV RBC AUTO: 83 FL (ref 80–100)
MONOCYTES # BLD: 0.7 K/UL (ref 0–1.3)
MONOCYTES NFR BLD: 8.4 %
NEUTROPHILS # BLD: 5.4 K/UL (ref 1.7–7.7)
NEUTROPHILS NFR BLD: 65.7 %
PHOSPHATE SERPL-MCNC: 3.5 MG/DL (ref 2.5–4.9)
PLATELET # BLD AUTO: 288 K/UL (ref 135–450)
PMV BLD AUTO: 6 FL (ref 5–10.5)
POTASSIUM SERPL-SCNC: 4.1 MMOL/L (ref 3.5–5.1)
RBC # BLD AUTO: 5.27 M/UL (ref 4–5.2)
SODIUM SERPL-SCNC: 131 MMOL/L (ref 136–145)
SODIUM SERPL-SCNC: 132 MMOL/L (ref 136–145)
SODIUM SERPL-SCNC: 132 MMOL/L (ref 136–145)
WBC # BLD AUTO: 8.3 K/UL (ref 4–11)

## 2025-07-26 PROCEDURE — 6370000000 HC RX 637 (ALT 250 FOR IP)

## 2025-07-26 PROCEDURE — 84295 ASSAY OF SERUM SODIUM: CPT

## 2025-07-26 PROCEDURE — 6370000000 HC RX 637 (ALT 250 FOR IP): Performed by: INTERNAL MEDICINE

## 2025-07-26 PROCEDURE — 80069 RENAL FUNCTION PANEL: CPT

## 2025-07-26 PROCEDURE — 85025 COMPLETE CBC W/AUTO DIFF WBC: CPT

## 2025-07-26 PROCEDURE — 36415 COLL VENOUS BLD VENIPUNCTURE: CPT

## 2025-07-26 PROCEDURE — 6360000002 HC RX W HCPCS

## 2025-07-26 PROCEDURE — 83735 ASSAY OF MAGNESIUM: CPT

## 2025-07-26 RX ORDER — MIRTAZAPINE 15 MG/1
15 TABLET, FILM COATED ORAL NIGHTLY
Qty: 30 TABLET | Refills: 0 | Status: SHIPPED | OUTPATIENT
Start: 2025-07-26

## 2025-07-26 RX ORDER — IBUPROFEN 600 MG/1
600 TABLET, FILM COATED ORAL EVERY 8 HOURS PRN
Qty: 120 TABLET | Refills: 3 | Status: SHIPPED | OUTPATIENT
Start: 2025-07-26

## 2025-07-26 RX ORDER — AMLODIPINE BESYLATE 5 MG/1
5 TABLET ORAL DAILY
Qty: 30 TABLET | Refills: 0 | Status: SHIPPED | OUTPATIENT
Start: 2025-07-27

## 2025-07-26 RX ORDER — MIRTAZAPINE 15 MG/1
15 TABLET, FILM COATED ORAL NIGHTLY
Qty: 30 TABLET | Refills: 0 | Status: SHIPPED | OUTPATIENT
Start: 2025-07-26 | End: 2025-07-26

## 2025-07-26 RX ORDER — AMLODIPINE BESYLATE 5 MG/1
5 TABLET ORAL DAILY
Qty: 30 TABLET | Refills: 0 | Status: SHIPPED | OUTPATIENT
Start: 2025-07-27 | End: 2025-07-26

## 2025-07-26 RX ADMIN — ENOXAPARIN SODIUM 30 MG: 100 INJECTION SUBCUTANEOUS at 08:17

## 2025-07-26 RX ADMIN — AMLODIPINE BESYLATE 5 MG: 5 TABLET ORAL at 08:17

## 2025-07-26 RX ADMIN — MONTELUKAST 10 MG: 10 TABLET, FILM COATED ORAL at 08:17

## 2025-07-26 RX ADMIN — EZETIMIBE 10 MG: 10 TABLET ORAL at 08:17

## 2025-07-26 ASSESSMENT — PAIN SCALES - GENERAL
PAINLEVEL_OUTOF10: 0
PAINLEVEL_OUTOF10: 0

## 2025-07-26 NOTE — DISCHARGE INSTRUCTIONS
Dear Micaela Castro    You were admitted for low sodium levels. You are stable for discharge.  - Medication adjustments:   STOP taking chlorthalidone   STOP taking fluoxetine  START taking Remeron for anxiety /depression as instructed   START taking norvasc (amlodipine) for high blood pressures daily    - Please follow up with the following specialists  Schedule a follow up with your primary care doctor to discuss your hospital stay and monitor your labs, new medications    It was a pleasure taking care of you.   If you develop headaches, dizziness, chest pain, shortness of breath please go to the nearest emergency department

## 2025-07-26 NOTE — DISCHARGE SUMMARY
INTERNAL MEDICINE DEPARTMENT  DISCHARGE SUMMARY    Patient ID: Micaela Castro                                             Discharge Date: 7/26/2025   Patient's PCP: Juventino Faust MD                                          Discharge Physician: Jr Jackson DO   Admit Date: 7/23/2025   Admitting Physician: Lance Wooten MD    PROBLEMS DURING HOSPITALIZATION:  Present on Admission:   Hyponatremia      DISCHARGE DIAGNOSES:  Severe symptomatic hyponatremia; resolved  Hypertension     Hospital Course:  3 days    HPI - Micaela Castro is a 64 y.o. female with pmhx of newly diagnosed HTN, depression, vertigo who presented to [facility] ED on 07/23/25 with dizziness. On 07/14/25 was started on chlorthalidone 25 mg qd by her PCP for newly diagnosed HTN. Since then, has had sxs including flashes of light, cognitive slowing with speaking. Patient stopped the chlorthalidone on 07/20/25, but continued to have symptoms.    Presentation - NIH 0, Na found to be 114, she was given 1 L LR and started on NS at 75cc/hr. CT Head WO & CTA Head / Neck were unremarkable.    Management/ workup - Admitted to ICU for severe hyponatremia and close sodium monitoring and nephrology consulted. Given a dose of ddavp due to overcorrection, as well as 2 doses of tolvaptan (7/24, 7/25). Urine studies showed Na 66, osm 262. Serum osm 246. Given timeline of thiazide initiation outpatient and sxs onset, suspect this is mostly etiology of hyponatremia. Additionally, also on ssri that could have contributed to siadh. Serum osm low at 246 suggestive of hypovolemic hyponatremia.  Na level stable at 132 on 7/26 and patient was cleared for discharge.     Discharge recs - Her thiazide was stopped in house, and she was started on norvasc 5 daily. SSRI was discontinued and she was started on remeron. Instructed to follow up with PCP within a week to discuss hospital stay.      Physical Exam:  Vitals: /85   Pulse 74   Temp 97.3 °F (36.3 °C) (Temporal)   Resp 14

## 2025-07-26 NOTE — PLAN OF CARE
Problem: Discharge Planning  Goal: Discharge to home or other facility with appropriate resources  7/26/2025 1213 by Adia Harris RN  Outcome: Completed  Flowsheets (Taken 7/26/2025 0800)  Discharge to home or other facility with appropriate resources:   Identify barriers to discharge with patient and caregiver   Arrange for needed discharge resources and transportation as appropriate   Identify discharge learning needs (meds, wound care, etc)   Refer to discharge planning if patient needs post-hospital services based on physician order or complex needs related to functional status, cognitive ability or social support system    Problem: Skin/Tissue Integrity  Goal: Skin integrity remains intact  Description: 1.  Monitor for areas of redness and/or skin breakdown  2.  Assess vascular access sites hourly  3.  Every 4-6 hours minimum:  Change oxygen saturation probe site  4.  Every 4-6 hours:  If on nasal continuous positive airway pressure, respiratory therapy assess nares and determine need for appliance change or resting period  7/26/2025 1213 by Adia Harris RN  Outcome: Completed  Flowsheets  Taken 7/26/2025 1200 by Adia Harris RN  Skin Integrity Remains Intact: Monitor for areas of redness and/or skin breakdown  Taken 7/26/2025 0800 by Adia Harris RN  Skin Integrity Remains Intact: Monitor for areas of redness and/or skin breakdown    Problem: Infection - Adult  Goal: Absence of infection during hospitalization  7/26/2025 1213 by Adia Harris RN  Outcome: Completed  Flowsheets (Taken 7/26/2025 0800)  Absence of infection during hospitalization:   Assess and monitor for signs and symptoms of infection   Monitor lab/diagnostic results     Problem: Metabolic/Fluid and Electrolytes - Adult  Goal: Electrolytes maintained within normal limits  7/26/2025 1213 by Adia Harris RN  Outcome: Completed  Flowsheets (Taken 7/26/2025 0800)  Electrolytes maintained within normal limits:

## 2025-07-26 NOTE — PROGRESS NOTES
Pt's sodium 132. RN attempted to notify physician for Sodium > 130 per order. Called phone given in order, [phone] and perfect served [place] resident.

## 2025-07-26 NOTE — PROGRESS NOTES
Physical Therapy/Occupational Therapy      PT/OT referrals received, chart reviewed.  Spoke with nursing who states pt is walking independently, and showered on her own this morning.  RN observed pt picking object up off floor with steady balance.  No skilled therapy needed at this time.  Please write new order if pt's mobility status declines.  D/c.     April Patel PT 2010  Day Fraser, OTR/L [id number]

## 2025-07-26 NOTE — PROGRESS NOTES
Ph: [phone], Fax: [phone]           [facility]               Reason for admission:                 Unsteadiness and headache.    Brief Summary :     Micaela Castro is being seen by nephrology for severe hyponatremia.      Interval History and plan:   Patient seen at bedside  Comfortable  /83  On room air  Urine output 1800 ml   Cr 0.7  Na 125 > 132 today.   Patient wan to go home      Monitor Na level closely. If remain stable can go home today but if start to drop again then monitor.   No need for Tolvaptan for now given improving Na  Replete rest of lytes PRN                           Assessment :     Hyponatremia: On arrival sodium was 113.  She had intermittent hyponatremia in the past.  Earlier this month her sodium was 134.    Serum glucose is normal.  I will check urine studies for SIADH although it appears that it is from chlorthalidone.  Since she has CNS changes earlier in the day she should be admitted in the ICU.  I will monitor sodium every 4 hour    Accelerated hypertension: Blood pressure is high and she stopped chlorthalidone.  Will start amlodipine and titrate as needed.           [facility] would like to thank Lance Wooten MD   for opportunity to serve this patient      Please call with questions at-   24 Hrs Answering service [phone] or  7 am- 5 pm via Perfect serve or cell phone  Dr.Muhammad DARLENE Gamboa MD       HPI :     Micaela Castro is a 65 y.o. female presented to   the hospital on 7/23/2025 with unsteadiness and headache.    She has past medical history of depression, diverticulitis, GERD, osteoarthritis with chronic right shoulder pain.  She is status post colon surgery due to an abscess.  She occasionally has vertigo and takes meclizine.  She sees Dr. Dyer from gastroenterology for colon polyps and screening.  She is status post partial colectomy.  Recently she was hypertensive and was started on chlorthalidone.    She did not feel well after  43.7    295 288     BMP:    Recent Labs     07/24/25  0527 07/24/25  0809 07/25/25  0235 07/25/25  1444 07/25/25  2132 07/26/25  0221 07/26/25  0453   *   < > 126*  125*   < > 131* 131* 132*   K 3.6  --  3.7  --   --   --  4.1   CL 86*  --  89*  --   --   --  95*   CO2 24  --  24  --   --   --  26   BUN 8  --  7  --   --   --  12   CREATININE 0.6  --  0.6  --   --   --  0.7   GLUCOSE 115*  --  110*  --   --   --  109*   MG 2.09  --  2.23  --   --   --  2.28   PHOS 2.8  --  3.0  --   --   --  3.5    < > = values in this interval not displayed.     Lab Results   Component Value Date/Time    COLORU Yellow 07/23/2025 12:32 PM    NITRU Negative 07/23/2025 12:32 PM    GLUCOSEU Negative 07/23/2025 12:32 PM    KETUA TRACE 07/23/2025 12:32 PM    UROBILINOGEN 0.2 07/23/2025 12:32 PM    BILIRUBINUR Negative 07/23/2025 12:32 PM        ----------------------------------------------------------  Please call with questions at      24 Hrs Answering service [phone]  Perfect serve, or cell phone 7 am - 5pm  Margret Gamboa MD  [facility]

## 2025-07-26 NOTE — PLAN OF CARE
Problem: Discharge Planning  Goal: Discharge to home or other facility with appropriate resources  Outcome: Progressing     Problem: Pain  Goal: Verbalizes/displays adequate comfort level or baseline comfort level  Outcome: Progressing     Problem: Safety - Adult  Goal: Free from fall injury  Outcome: Progressing     Problem: Skin/Tissue Integrity  Goal: Skin integrity remains intact  Description: 1.  Monitor for areas of redness and/or skin breakdown  2.  Assess vascular access sites hourly  3.  Every 4-6 hours minimum:  Change oxygen saturation probe site  4.  Every 4-6 hours:  If on nasal continuous positive airway pressure, respiratory therapy assess nares and determine need for appliance change or resting period  Outcome: Progressing  Flowsheets  Taken 7/26/2025 0000  Skin Integrity Remains Intact: Monitor for areas of redness and/or skin breakdown  Taken 7/25/2025 2000  Skin Integrity Remains Intact: Monitor for areas of redness and/or skin breakdown     Problem: Infection - Adult  Goal: Absence of infection at discharge  Outcome: Progressing  Goal: Absence of infection during hospitalization  Outcome: Progressing     Problem: Metabolic/Fluid and Electrolytes - Adult  Goal: Electrolytes maintained within normal limits  Outcome: Progressing  Goal: Hemodynamic stability and optimal renal function maintained  Outcome: Progressing

## 2025-07-26 NOTE — PROGRESS NOTES
Internal Medicine Progress Note    Date: 7/26/2025   Patient: Micaela Castro   [facility] Day: 3    CC: Balance / gait issues     Interval Hx   - Seen at bedside, no focal neurologic deficits or any acute complaints  - Na at 132   - Remainder of labs WNL  - Will monitor Na levels today; possible dc later today vs tomorrow      HPI: Briefly, Mrs Micaela Castro is a 63-year-old F with depression, HTN, GERD who presented on 7/23 for feeling off balance and currently admitted for management of severe hyponatremia.     Objective     Vital Signs:  Patient Vitals for the past 8 hrs:   BP Temp Temp src Pulse Resp SpO2 Weight   07/26/25 0600 111/83 -- -- 74 14 97 % --   07/26/25 0500 122/84 -- -- 74 12 96 % 107.1 kg (236 lb 1.8 oz)   07/26/25 0400 125/80 98.6 °F (37 °C) Oral 68 11 94 % --   07/26/25 0300 100/68 -- -- 74 13 94 % --   07/26/25 0200 112/70 -- -- 72 13 96 % --   07/26/25 0100 110/65 -- -- 78 15 95 % --       Physical Exam  Constitutional:       General: She is not in acute distress.     Appearance: She is not ill-appearing, toxic-appearing or diaphoretic.   HENT:      Head: Normocephalic and atraumatic.      Mouth/Throat:      Pharynx: Oropharynx is clear.   Eyes:      Conjunctiva/sclera: Conjunctivae normal.   Cardiovascular:      Rate and Rhythm: Normal rate and regular rhythm.      Pulses: Normal pulses.   Pulmonary:      Effort: Pulmonary effort is normal.      Breath sounds: Normal breath sounds.   Abdominal:      General: There is no distension.      Tenderness: There is no abdominal tenderness.   Musculoskeletal:      Right lower leg: No edema.      Left lower leg: No edema.   Skin:     General: Skin is warm and dry.      Capillary Refill: Capillary refill takes less than 2 seconds.   Neurological:      Mental Status: She is alert and oriented to person, place, and time. Mental status is at baseline.            Labs:  CBC:   Recent Labs     07/24/25  0527 07/25/25  0235 07/26/25  0453   WBC 9.8 10.8 8.3   HGB